# Patient Record
Sex: FEMALE | Race: WHITE | NOT HISPANIC OR LATINO
[De-identification: names, ages, dates, MRNs, and addresses within clinical notes are randomized per-mention and may not be internally consistent; named-entity substitution may affect disease eponyms.]

---

## 2018-06-16 ENCOUNTER — TRANSCRIPTION ENCOUNTER (OUTPATIENT)
Age: 33
End: 2018-06-16

## 2018-06-17 ENCOUNTER — INPATIENT (INPATIENT)
Facility: HOSPITAL | Age: 33
LOS: 2 days | Discharge: HOME | End: 2018-06-20
Attending: OBSTETRICS & GYNECOLOGY | Admitting: OBSTETRICS & GYNECOLOGY
Payer: COMMERCIAL

## 2018-06-17 ENCOUNTER — RESULT REVIEW (OUTPATIENT)
Age: 33
End: 2018-06-17

## 2018-06-17 VITALS — HEIGHT: 67 IN | WEIGHT: 202.83 LBS

## 2018-06-17 DIAGNOSIS — O26.899 OTHER SPECIFIED PREGNANCY RELATED CONDITIONS, UNSPECIFIED TRIMESTER: ICD-10-CM

## 2018-06-17 DIAGNOSIS — Z34.80 ENCOUNTER FOR SUPERVISION OF OTHER NORMAL PREGNANCY, UNSPECIFIED TRIMESTER: ICD-10-CM

## 2018-06-17 DIAGNOSIS — Z3A.00 WEEKS OF GESTATION OF PREGNANCY NOT SPECIFIED: ICD-10-CM

## 2018-06-17 LAB
BASOPHILS # BLD AUTO: 0 K/UL — SIGNIFICANT CHANGE UP (ref 0–0.2)
BASOPHILS NFR BLD AUTO: 0.2 % — SIGNIFICANT CHANGE UP (ref 0–2)
BLD GP AB SCN SERPL QL: NEGATIVE — SIGNIFICANT CHANGE UP
EOSINOPHIL # BLD AUTO: 0 K/UL — SIGNIFICANT CHANGE UP (ref 0–0.5)
EOSINOPHIL NFR BLD AUTO: 0.3 % — SIGNIFICANT CHANGE UP (ref 0–6)
HCT VFR BLD CALC: 39.4 % — SIGNIFICANT CHANGE UP (ref 34.5–45)
HGB BLD-MCNC: 13.8 G/DL — SIGNIFICANT CHANGE UP (ref 11.5–15.5)
LYMPHOCYTES # BLD AUTO: 1.8 K/UL — SIGNIFICANT CHANGE UP (ref 1–3.3)
LYMPHOCYTES # BLD AUTO: 14.6 % — SIGNIFICANT CHANGE UP (ref 13–44)
MCHC RBC-ENTMCNC: 31.3 PG — SIGNIFICANT CHANGE UP (ref 27–34)
MCHC RBC-ENTMCNC: 35 GM/DL — SIGNIFICANT CHANGE UP (ref 32–36)
MCV RBC AUTO: 89.6 FL — SIGNIFICANT CHANGE UP (ref 80–100)
MONOCYTES # BLD AUTO: 0.6 K/UL — SIGNIFICANT CHANGE UP (ref 0–0.9)
MONOCYTES NFR BLD AUTO: 5.3 % — SIGNIFICANT CHANGE UP (ref 2–14)
NEUTROPHILS # BLD AUTO: 9.5 K/UL — HIGH (ref 1.8–7.4)
NEUTROPHILS NFR BLD AUTO: 79.6 % — HIGH (ref 43–77)
PLATELET # BLD AUTO: 252 K/UL — SIGNIFICANT CHANGE UP (ref 150–400)
RBC # BLD: 4.4 M/UL — SIGNIFICANT CHANGE UP (ref 3.8–5.2)
RBC # FLD: 12.7 % — SIGNIFICANT CHANGE UP (ref 10.3–14.5)
RH IG SCN BLD-IMP: POSITIVE — SIGNIFICANT CHANGE UP
RH IG SCN BLD-IMP: POSITIVE — SIGNIFICANT CHANGE UP
WBC # BLD: 12 K/UL — HIGH (ref 3.8–10.5)
WBC # FLD AUTO: 12 K/UL — HIGH (ref 3.8–10.5)

## 2018-06-17 PROCEDURE — 88307 TISSUE EXAM BY PATHOLOGIST: CPT | Mod: 26

## 2018-06-17 RX ORDER — KETOROLAC TROMETHAMINE 30 MG/ML
30 SYRINGE (ML) INJECTION EVERY 6 HOURS
Qty: 0 | Refills: 0 | Status: DISCONTINUED | OUTPATIENT
Start: 2018-06-17 | End: 2018-06-19

## 2018-06-17 RX ORDER — OXYTOCIN 10 UNIT/ML
2 VIAL (ML) INJECTION
Qty: 30 | Refills: 0 | Status: DISCONTINUED | OUTPATIENT
Start: 2018-06-17 | End: 2018-06-20

## 2018-06-17 RX ORDER — TETANUS TOXOID, REDUCED DIPHTHERIA TOXOID AND ACELLULAR PERTUSSIS VACCINE, ADSORBED 5; 2.5; 8; 8; 2.5 [IU]/.5ML; [IU]/.5ML; UG/.5ML; UG/.5ML; UG/.5ML
0.5 SUSPENSION INTRAMUSCULAR ONCE
Qty: 0 | Refills: 0 | Status: DISCONTINUED | OUTPATIENT
Start: 2018-06-18 | End: 2018-06-20

## 2018-06-17 RX ORDER — HEPARIN SODIUM 5000 [USP'U]/ML
5000 INJECTION INTRAVENOUS; SUBCUTANEOUS EVERY 12 HOURS
Qty: 0 | Refills: 0 | Status: DISCONTINUED | OUTPATIENT
Start: 2018-06-17 | End: 2018-06-20

## 2018-06-17 RX ORDER — FENTANYL CITRATE 50 UG/ML
250 INJECTION INTRAVENOUS
Qty: 0 | Refills: 0 | Status: DISCONTINUED | OUTPATIENT
Start: 2018-06-17 | End: 2018-06-19

## 2018-06-17 RX ORDER — OXYTOCIN 10 UNIT/ML
333.33 VIAL (ML) INJECTION
Qty: 20 | Refills: 0 | Status: DISCONTINUED | OUTPATIENT
Start: 2018-06-17 | End: 2018-06-17

## 2018-06-17 RX ORDER — OXYTOCIN 10 UNIT/ML
333.33 VIAL (ML) INJECTION
Qty: 20 | Refills: 0 | Status: DISCONTINUED | OUTPATIENT
Start: 2018-06-17 | End: 2018-06-20

## 2018-06-17 RX ORDER — SODIUM CHLORIDE 9 MG/ML
500 INJECTION, SOLUTION INTRAVENOUS ONCE
Qty: 0 | Refills: 0 | Status: DISCONTINUED | OUTPATIENT
Start: 2018-06-17 | End: 2018-06-17

## 2018-06-17 RX ORDER — LANOLIN
1 OINTMENT (GRAM) TOPICAL
Qty: 0 | Refills: 0 | Status: DISCONTINUED | OUTPATIENT
Start: 2018-06-18 | End: 2018-06-20

## 2018-06-17 RX ORDER — GLYCERIN ADULT
1 SUPPOSITORY, RECTAL RECTAL AT BEDTIME
Qty: 0 | Refills: 0 | Status: DISCONTINUED | OUTPATIENT
Start: 2018-06-18 | End: 2018-06-20

## 2018-06-17 RX ORDER — OXYCODONE HYDROCHLORIDE 5 MG/1
5 TABLET ORAL EVERY 4 HOURS
Qty: 0 | Refills: 0 | Status: DISCONTINUED | OUTPATIENT
Start: 2018-06-17 | End: 2018-06-20

## 2018-06-17 RX ORDER — SODIUM CHLORIDE 9 MG/ML
1000 INJECTION, SOLUTION INTRAVENOUS
Qty: 0 | Refills: 0 | Status: DISCONTINUED | OUTPATIENT
Start: 2018-06-18 | End: 2018-06-20

## 2018-06-17 RX ORDER — DIPHENHYDRAMINE HCL 50 MG
25 CAPSULE ORAL EVERY 6 HOURS
Qty: 0 | Refills: 0 | Status: DISCONTINUED | OUTPATIENT
Start: 2018-06-17 | End: 2018-06-20

## 2018-06-17 RX ORDER — SODIUM CHLORIDE 9 MG/ML
1000 INJECTION, SOLUTION INTRAVENOUS
Qty: 0 | Refills: 0 | Status: DISCONTINUED | OUTPATIENT
Start: 2018-06-17 | End: 2018-06-17

## 2018-06-17 RX ORDER — OXYTOCIN 10 UNIT/ML
41.67 VIAL (ML) INJECTION
Qty: 20 | Refills: 0 | Status: DISCONTINUED | OUTPATIENT
Start: 2018-06-18 | End: 2018-06-20

## 2018-06-17 RX ORDER — NALOXONE HYDROCHLORIDE 4 MG/.1ML
0.1 SPRAY NASAL
Qty: 0 | Refills: 0 | Status: DISCONTINUED | OUTPATIENT
Start: 2018-06-17 | End: 2018-06-19

## 2018-06-17 RX ORDER — IBUPROFEN 200 MG
600 TABLET ORAL EVERY 6 HOURS
Qty: 0 | Refills: 0 | Status: COMPLETED | OUTPATIENT
Start: 2018-06-17 | End: 2019-05-16

## 2018-06-17 RX ORDER — OXYCODONE HYDROCHLORIDE 5 MG/1
5 TABLET ORAL
Qty: 0 | Refills: 0 | Status: COMPLETED | OUTPATIENT
Start: 2018-06-17 | End: 2018-06-24

## 2018-06-17 RX ORDER — CITRIC ACID/SODIUM CITRATE 300-500 MG
15 SOLUTION, ORAL ORAL EVERY 4 HOURS
Qty: 0 | Refills: 0 | Status: DISCONTINUED | OUTPATIENT
Start: 2018-06-17 | End: 2018-06-17

## 2018-06-17 RX ORDER — SIMETHICONE 80 MG/1
80 TABLET, CHEWABLE ORAL EVERY 4 HOURS
Qty: 0 | Refills: 0 | Status: DISCONTINUED | OUTPATIENT
Start: 2018-06-17 | End: 2018-06-20

## 2018-06-17 RX ORDER — FERROUS SULFATE 325(65) MG
325 TABLET ORAL DAILY
Qty: 0 | Refills: 0 | Status: DISCONTINUED | OUTPATIENT
Start: 2018-06-17 | End: 2018-06-20

## 2018-06-17 RX ORDER — OXYTOCIN 10 UNIT/ML
41.67 VIAL (ML) INJECTION
Qty: 20 | Refills: 0 | Status: DISCONTINUED | OUTPATIENT
Start: 2018-06-17 | End: 2018-06-17

## 2018-06-17 RX ORDER — ONDANSETRON 8 MG/1
4 TABLET, FILM COATED ORAL EVERY 6 HOURS
Qty: 0 | Refills: 0 | Status: DISCONTINUED | OUTPATIENT
Start: 2018-06-17 | End: 2018-06-19

## 2018-06-17 RX ORDER — DEXAMETHASONE 0.5 MG/5ML
4 ELIXIR ORAL EVERY 6 HOURS
Qty: 0 | Refills: 0 | Status: DISCONTINUED | OUTPATIENT
Start: 2018-06-17 | End: 2018-06-19

## 2018-06-17 RX ORDER — ACETAMINOPHEN 500 MG
975 TABLET ORAL EVERY 6 HOURS
Qty: 0 | Refills: 0 | Status: DISCONTINUED | OUTPATIENT
Start: 2018-06-17 | End: 2018-06-20

## 2018-06-17 RX ORDER — SODIUM CHLORIDE 9 MG/ML
1000 INJECTION, SOLUTION INTRAVENOUS
Qty: 0 | Refills: 0 | Status: DISCONTINUED | OUTPATIENT
Start: 2018-06-17 | End: 2018-06-20

## 2018-06-17 RX ORDER — DOCUSATE SODIUM 100 MG
100 CAPSULE ORAL
Qty: 0 | Refills: 0 | Status: DISCONTINUED | OUTPATIENT
Start: 2018-06-17 | End: 2018-06-20

## 2018-06-17 RX ADMIN — Medication 2 MILLIUNIT(S)/MIN: at 14:35

## 2018-06-17 RX ADMIN — FENTANYL CITRATE 250 MILLILITER(S): 50 INJECTION INTRAVENOUS at 19:21

## 2018-06-17 RX ADMIN — FENTANYL CITRATE 250 MILLILITER(S): 50 INJECTION INTRAVENOUS at 21:08

## 2018-06-18 LAB
HCT VFR BLD CALC: 35.2 % — SIGNIFICANT CHANGE UP (ref 34.5–45)
HGB BLD-MCNC: 11.7 G/DL — SIGNIFICANT CHANGE UP (ref 11.5–15.5)
MCHC RBC-ENTMCNC: 30.2 PG — SIGNIFICANT CHANGE UP (ref 27–34)
MCHC RBC-ENTMCNC: 33.2 GM/DL — SIGNIFICANT CHANGE UP (ref 32–36)
MCV RBC AUTO: 90.8 FL — SIGNIFICANT CHANGE UP (ref 80–100)
PLATELET # BLD AUTO: 227 K/UL — SIGNIFICANT CHANGE UP (ref 150–400)
RBC # BLD: 3.87 M/UL — SIGNIFICANT CHANGE UP (ref 3.8–5.2)
RBC # FLD: 12.9 % — SIGNIFICANT CHANGE UP (ref 10.3–14.5)
T PALLIDUM AB TITR SER: NEGATIVE — SIGNIFICANT CHANGE UP
WBC # BLD: 14.1 K/UL — HIGH (ref 3.8–10.5)
WBC # FLD AUTO: 14.1 K/UL — HIGH (ref 3.8–10.5)

## 2018-06-18 RX ADMIN — HEPARIN SODIUM 5000 UNIT(S): 5000 INJECTION INTRAVENOUS; SUBCUTANEOUS at 06:33

## 2018-06-18 RX ADMIN — Medication 975 MILLIGRAM(S): at 12:13

## 2018-06-18 RX ADMIN — Medication 975 MILLIGRAM(S): at 12:45

## 2018-06-18 RX ADMIN — FENTANYL CITRATE 250 MILLILITER(S): 50 INJECTION INTRAVENOUS at 15:27

## 2018-06-18 RX ADMIN — Medication 975 MILLIGRAM(S): at 00:37

## 2018-06-18 RX ADMIN — Medication 975 MILLIGRAM(S): at 06:35

## 2018-06-18 RX ADMIN — Medication 30 MILLIGRAM(S): at 06:34

## 2018-06-18 RX ADMIN — Medication 30 MILLIGRAM(S): at 17:06

## 2018-06-18 RX ADMIN — SIMETHICONE 80 MILLIGRAM(S): 80 TABLET, CHEWABLE ORAL at 23:23

## 2018-06-18 RX ADMIN — Medication 975 MILLIGRAM(S): at 17:35

## 2018-06-18 RX ADMIN — Medication 30 MILLIGRAM(S): at 23:23

## 2018-06-18 RX ADMIN — Medication 975 MILLIGRAM(S): at 01:30

## 2018-06-18 RX ADMIN — Medication 325 MILLIGRAM(S): at 12:14

## 2018-06-18 RX ADMIN — SIMETHICONE 80 MILLIGRAM(S): 80 TABLET, CHEWABLE ORAL at 17:05

## 2018-06-18 RX ADMIN — Medication 30 MILLIGRAM(S): at 12:14

## 2018-06-18 RX ADMIN — Medication 30 MILLIGRAM(S): at 12:45

## 2018-06-18 RX ADMIN — Medication 975 MILLIGRAM(S): at 23:23

## 2018-06-18 RX ADMIN — Medication 30 MILLIGRAM(S): at 00:36

## 2018-06-18 RX ADMIN — Medication 30 MILLIGRAM(S): at 17:35

## 2018-06-18 RX ADMIN — Medication 30 MILLIGRAM(S): at 01:30

## 2018-06-18 RX ADMIN — HEPARIN SODIUM 5000 UNIT(S): 5000 INJECTION INTRAVENOUS; SUBCUTANEOUS at 17:05

## 2018-06-18 RX ADMIN — Medication 975 MILLIGRAM(S): at 17:05

## 2018-06-18 NOTE — PROGRESS NOTE ADULT - SUBJECTIVE AND OBJECTIVE BOX
Day 1 of Anesthesia Pain Management Service    SUBJECTIVE: I'm okay  Pain Scale Score:    [X] Refer to charted pain scores    THERAPY: Epidural Bupivacaine 0.01 % and Fentanyl 3 micrograms/mL     Demand Dose: 3 mL  Lockout: 15 minutes   Continuous Rate:  10 mL    MEDICATIONS  (STANDING):  acetaminophen   Tablet. 975 milliGRAM(s) Oral every 6 hours  dextrose 5% + lactated ringers. 1000 milliLiter(s) (250 mL/Hr) IV Continuous <Continuous>  fentaNYL (3 MICROgram(s)/mL) + BUpivacaine 0.01% in 0.9% Sodium Chloride PCEA 250 milliLiter(s) Epidural PCA Continuous  ferrous    sulfate 325 milliGRAM(s) Oral daily  heparin  Injectable 5000 Unit(s) SubCutaneous every 12 hours  ibuprofen  Tablet 600 milliGRAM(s) Oral every 6 hours  ketorolac   Injectable 30 milliGRAM(s) IV Push every 6 hours  oxyCODONE    IR 5 milliGRAM(s) Oral every 3 hours  oxytocin Infusion 333.333 milliUNIT(s)/Min (1000 mL/Hr) IV Continuous <Continuous>  oxytocin Infusion 2 milliUNIT(s)/Min (2 mL/Hr) IV Continuous <Continuous>    MEDICATIONS  (PRN):  dexamethasone  Injectable 4 milliGRAM(s) IV Push every 6 hours PRN Nausea, IF ondansetron is ineffective after 30 - 60 minutes  diphenhydrAMINE   Capsule 25 milliGRAM(s) Oral every 6 hours PRN Itching  docusate sodium 100 milliGRAM(s) Oral two times a day PRN Stool Softening  fentaNYL (3 MICROgram(s)/mL) + BUpivacaine 0.01% in 0.9% Sodium Chloride PCEA Rescue Clinician Bolus 5 milliLiter(s) Epidural every 15 minutes PRN Moderate Pain (4 - 6)  naloxone Injectable 0.1 milliGRAM(s) IV Push every 3 minutes PRN For ANY of the following changes in patient status:  A. RR LESS THAN 10 breaths per minute, B. Oxygen saturation LESS THAN 90%, C. Sedation score of 6  ondansetron Injectable 4 milliGRAM(s) IV Push every 6 hours PRN Nausea  oxyCODONE    IR 5 milliGRAM(s) Oral every 4 hours PRN Severe Pain (7 - 10)  simethicone 80 milliGRAM(s) Chew every 4 hours PRN Gas      OBJECTIVE:    Assessment of Epidural Catheter Site: 	    [X] Dressing intact	[X] Site non-tender	[X] Site without erythema, discharge, edema  [X] Epidural tubing and connection checked	[X] Gross neurological exam within normal limits  [ ] Catheter removed – tip intact		                          11.7   14.1  )-----------( 227      ( 18 Jun 2018 06:44 )             35.2     Vital Signs Last 24 Hrs  T(C): 36.7 (06-18-18 @ 05:46), Max: 37.2 (06-17-18 @ 21:50)  T(F): 98.1 (06-18-18 @ 05:46), Max: 99 (06-17-18 @ 21:50)  HR: 67 (06-18-18 @ 05:46) (67 - 86)  BP: 103/67 (06-18-18 @ 05:46) (103/67 - 126/71)  BP(mean): 86 (06-17-18 @ 21:05) (79 - 86)  RR: 18 (06-18-18 @ 05:46) (16 - 27)  SpO2: 98% (06-18-18 @ 05:46) (96% - 100%)      Sedation Score:	[X] Alert	[ ] Drowsy	[ ] Arousable  [ ] Asleep  [ ] Unresponsive    Side Effects:	[X] None	[ ] Nausea	[ ] Vomiting  [ ] Pruritus  		[ ] Weakness  [ ] Numbness  [ ] Other:    ASSESSMENT/ PLAN:    Therapy:                         [X] Continue   [ ] Discontinue   [ ] Change to PRN Analgesics    Documentation and Verification of current medications:  [X] Done	[ ] Not done, not eligible, reason:    Comments:

## 2018-06-18 NOTE — PROGRESS NOTE ADULT - SUBJECTIVE AND OBJECTIVE BOX
OB Attending Note      S: Pt feeling well, +F, pain controlled    Physical exam:    Vital Signs Last 24 Hrs  T(C): 36.6 (2018 09:04), Max: 37.2 (2018 21:50)  T(F): 97.9 (2018 09:04), Max: 99 (2018 21:50)  HR: 78 (2018 09:04) (67 - 86)  BP: 121/71 (2018 09:04) (103/67 - 126/71)  BP(mean): 86 (2018 21:05) (79 - 86)  RR: 18 (2018 09:04) (16 - 27)  SpO2: 98% (2018 09:04) (96% - 100%)          Gen: NAD  Breast: Soft, nontender, not engorged.  Abdomen: Soft, nontender, no distension , firm uterine fundus at umbilicus.  Incision: Clean, dry, and intact with steri strips  Scant Lochia  Ext: No calf tenderness    LABS:                        11.7   14.1  )-----------( 227      ( 2018 06:44 )             35.2                         13.8   12.0  )-----------( 252      ( 2018 12:05 )             39.4     ABO Interpretation: O ( @ 12:56)  Rh Interpretation: Positive ( @ 12:56)  Antibody Screen: Negative ( @ 12:12)  ABO Interpretation: O ( @ 12:12)  Rh Interpretation: Positive ( @ 12:12)                      Assessment and Plan  POD # 1 s/p  section  Doing well.  Continue Ambulation/OOB/Venodynes/heparin  Cont Pain medications  Regular diet

## 2018-06-18 NOTE — PROGRESS NOTE ADULT - PROBLEM SELECTOR PLAN 1
- continue with PCEA  - increase ambulation  - SCDs when in bed  - encourage incentive spirometry  - continue regular diet  - renew IV fluids  - check AM CBC  - d/c Lovett  - incision dressing removed    Nuvia Adan MD PGY1 - continue with PCEA  - increase ambulation  - SCDs when in bed  - encourage incentive spirometry  - continue regular diet  - renew IV fluids  - check AM CBC  - incision dressing removed    Nuvia Adan MD PGY2

## 2018-06-18 NOTE — PROGRESS NOTE ADULT - SUBJECTIVE AND OBJECTIVE BOX
Pain Management Attending Addendum    SUBJECTIVE:    Therapy:	  [ ] IV PCA	   [ ] Epidural           [ ] s/p Spinal Opoid              [x ] Postpartum infusion	  [ ] Patient controlled regional anesthesia (PCRA)    [ ] prn Analgesics    OBJECTIVE:   [x ] No new signs     [ ] Other:    Side Effects:  [x ] None			[ ] Other:    Assessment of Catheter Site:		[ x] Intact		[ ] Other:    ASSESSMENT/PLAN  [x ] Continue current therapy    [ ] Therapy changed to:    [ ] IV PCA       [ ] Epidural     [ ] prn Analgesics     [ ] post partum infusion    Comments:

## 2018-06-18 NOTE — PROGRESS NOTE ADULT - SUBJECTIVE AND OBJECTIVE BOX
Patient seen and examined at bedside, no acute overnight events. No acute complaints, pain well controlled. Patient is ambulating and tolerating regular diet. Has not yet passed flatus or had BM. Lovett is still in place.     Vital Signs Last 24 Hours  T(C): 36.7 (06-18-18 @ 05:46), Max: 37.2 (06-17-18 @ 21:50)  HR: 67 (06-18-18 @ 05:46) (67 - 86)  BP: 103/67 (06-18-18 @ 05:46) (103/67 - 126/71)  RR: 18 (06-18-18 @ 05:46) (16 - 27)  SpO2: 98% (06-18-18 @ 05:46) (96% - 100%)    I&O's Summary    17 Jun 2018 07:01  -  18 Jun 2018 06:48  --------------------------------------------------------  IN: 2500 mL / OUT: 3930 mL / NET: -1430 mL        Physical Exam:  General: NAD  Abdomen: soft, appropriately tender, non-distended, fundus firm  Incision: Pfannenstiel incision CDI, no drainage, no erythema, subcuticular suture closure, steristrips in place  Pelvic: lochia wnl    Labs:  Blood Type: O Positive  Antibody Screen: --  RPR: Negative               13.8   12.0  )-----------( 252      ( 06-17 @ 12:05 )             39.4         MEDICATIONS  (STANDING):  acetaminophen   Tablet. 975 milliGRAM(s) Oral every 6 hours  dextrose 5% + lactated ringers. 1000 milliLiter(s) (250 mL/Hr) IV Continuous <Continuous>  fentaNYL (3 MICROgram(s)/mL) + BUpivacaine 0.01% in 0.9% Sodium Chloride PCEA 250 milliLiter(s) Epidural PCA Continuous  ferrous    sulfate 325 milliGRAM(s) Oral daily  heparin  Injectable 5000 Unit(s) SubCutaneous every 12 hours  ibuprofen  Tablet 600 milliGRAM(s) Oral every 6 hours  ketorolac   Injectable 30 milliGRAM(s) IV Push every 6 hours  oxyCODONE    IR 5 milliGRAM(s) Oral every 3 hours  oxytocin Infusion 333.333 milliUNIT(s)/Min (1000 mL/Hr) IV Continuous <Continuous>  oxytocin Infusion 2 milliUNIT(s)/Min (2 mL/Hr) IV Continuous <Continuous>    MEDICATIONS  (PRN):  dexamethasone  Injectable 4 milliGRAM(s) IV Push every 6 hours PRN Nausea, IF ondansetron is ineffective after 30 - 60 minutes  diphenhydrAMINE   Capsule 25 milliGRAM(s) Oral every 6 hours PRN Itching  docusate sodium 100 milliGRAM(s) Oral two times a day PRN Stool Softening  fentaNYL (3 MICROgram(s)/mL) + BUpivacaine 0.01% in 0.9% Sodium Chloride PCEA Rescue Clinician Bolus 5 milliLiter(s) Epidural every 15 minutes PRN Moderate Pain (4 - 6)  naloxone Injectable 0.1 milliGRAM(s) IV Push every 3 minutes PRN For ANY of the following changes in patient status:  A. RR LESS THAN 10 breaths per minute, B. Oxygen saturation LESS THAN 90%, C. Sedation score of 6  ondansetron Injectable 4 milliGRAM(s) IV Push every 6 hours PRN Nausea  oxyCODONE    IR 5 milliGRAM(s) Oral every 4 hours PRN Severe Pain (7 - 10)  simethicone 80 milliGRAM(s) Chew every 4 hours PRN Gas Patient seen and examined at bedside, no acute overnight events. No acute complaints, pain well controlled. Patient is ambulating and tolerating regular diet. Has not yet passed flatus or had BM.     Vital Signs Last 24 Hours  T(C): 36.7 (06-18-18 @ 05:46), Max: 37.2 (06-17-18 @ 21:50)  HR: 67 (06-18-18 @ 05:46) (67 - 86)  BP: 103/67 (06-18-18 @ 05:46) (103/67 - 126/71)  RR: 18 (06-18-18 @ 05:46) (16 - 27)  SpO2: 98% (06-18-18 @ 05:46) (96% - 100%)    I&O's Summary    17 Jun 2018 07:01  -  18 Jun 2018 06:48  --------------------------------------------------------  IN: 2500 mL / OUT: 3930 mL / NET: -1430 mL        Physical Exam:  General: NAD  Abdomen: soft, appropriately tender, non-distended, fundus firm  Incision: Pfannenstiel incision CDI, no drainage, no erythema, subcuticular suture closure, steristrips in place  Pelvic: lochia wnl    Labs:  Blood Type: O Positive  Antibody Screen: --  RPR: Negative               13.8   12.0  )-----------( 252      ( 06-17 @ 12:05 )             39.4         MEDICATIONS  (STANDING):  acetaminophen   Tablet. 975 milliGRAM(s) Oral every 6 hours  dextrose 5% + lactated ringers. 1000 milliLiter(s) (250 mL/Hr) IV Continuous <Continuous>  fentaNYL (3 MICROgram(s)/mL) + BUpivacaine 0.01% in 0.9% Sodium Chloride PCEA 250 milliLiter(s) Epidural PCA Continuous  ferrous    sulfate 325 milliGRAM(s) Oral daily  heparin  Injectable 5000 Unit(s) SubCutaneous every 12 hours  ibuprofen  Tablet 600 milliGRAM(s) Oral every 6 hours  ketorolac   Injectable 30 milliGRAM(s) IV Push every 6 hours  oxyCODONE    IR 5 milliGRAM(s) Oral every 3 hours  oxytocin Infusion 333.333 milliUNIT(s)/Min (1000 mL/Hr) IV Continuous <Continuous>  oxytocin Infusion 2 milliUNIT(s)/Min (2 mL/Hr) IV Continuous <Continuous>    MEDICATIONS  (PRN):  dexamethasone  Injectable 4 milliGRAM(s) IV Push every 6 hours PRN Nausea, IF ondansetron is ineffective after 30 - 60 minutes  diphenhydrAMINE   Capsule 25 milliGRAM(s) Oral every 6 hours PRN Itching  docusate sodium 100 milliGRAM(s) Oral two times a day PRN Stool Softening  fentaNYL (3 MICROgram(s)/mL) + BUpivacaine 0.01% in 0.9% Sodium Chloride PCEA Rescue Clinician Bolus 5 milliLiter(s) Epidural every 15 minutes PRN Moderate Pain (4 - 6)  naloxone Injectable 0.1 milliGRAM(s) IV Push every 3 minutes PRN For ANY of the following changes in patient status:  A. RR LESS THAN 10 breaths per minute, B. Oxygen saturation LESS THAN 90%, C. Sedation score of 6  ondansetron Injectable 4 milliGRAM(s) IV Push every 6 hours PRN Nausea  oxyCODONE    IR 5 milliGRAM(s) Oral every 4 hours PRN Severe Pain (7 - 10)  simethicone 80 milliGRAM(s) Chew every 4 hours PRN Gas

## 2018-06-19 RX ORDER — OXYCODONE HYDROCHLORIDE 5 MG/1
5 TABLET ORAL
Qty: 0 | Refills: 0 | Status: DISCONTINUED | OUTPATIENT
Start: 2018-06-19 | End: 2018-06-20

## 2018-06-19 RX ORDER — IBUPROFEN 200 MG
600 TABLET ORAL EVERY 6 HOURS
Qty: 0 | Refills: 0 | Status: DISCONTINUED | OUTPATIENT
Start: 2018-06-19 | End: 2018-06-20

## 2018-06-19 RX ADMIN — Medication 600 MILLIGRAM(S): at 11:20

## 2018-06-19 RX ADMIN — Medication 1 TABLET(S): at 11:24

## 2018-06-19 RX ADMIN — OXYCODONE HYDROCHLORIDE 5 MILLIGRAM(S): 5 TABLET ORAL at 11:58

## 2018-06-19 RX ADMIN — Medication 975 MILLIGRAM(S): at 06:03

## 2018-06-19 RX ADMIN — Medication 30 MILLIGRAM(S): at 06:03

## 2018-06-19 RX ADMIN — Medication 975 MILLIGRAM(S): at 17:12

## 2018-06-19 RX ADMIN — HEPARIN SODIUM 5000 UNIT(S): 5000 INJECTION INTRAVENOUS; SUBCUTANEOUS at 06:03

## 2018-06-19 RX ADMIN — Medication 975 MILLIGRAM(S): at 11:20

## 2018-06-19 RX ADMIN — Medication 600 MILLIGRAM(S): at 11:50

## 2018-06-19 RX ADMIN — SIMETHICONE 80 MILLIGRAM(S): 80 TABLET, CHEWABLE ORAL at 17:13

## 2018-06-19 RX ADMIN — HEPARIN SODIUM 5000 UNIT(S): 5000 INJECTION INTRAVENOUS; SUBCUTANEOUS at 17:13

## 2018-06-19 RX ADMIN — Medication 100 MILLIGRAM(S): at 17:13

## 2018-06-19 RX ADMIN — Medication 100 MILLIGRAM(S): at 11:20

## 2018-06-19 RX ADMIN — SIMETHICONE 80 MILLIGRAM(S): 80 TABLET, CHEWABLE ORAL at 11:20

## 2018-06-19 RX ADMIN — Medication 975 MILLIGRAM(S): at 23:33

## 2018-06-19 RX ADMIN — Medication 30 MILLIGRAM(S): at 06:20

## 2018-06-19 RX ADMIN — Medication 600 MILLIGRAM(S): at 17:47

## 2018-06-19 RX ADMIN — Medication 975 MILLIGRAM(S): at 11:50

## 2018-06-19 RX ADMIN — OXYCODONE HYDROCHLORIDE 5 MILLIGRAM(S): 5 TABLET ORAL at 12:30

## 2018-06-19 RX ADMIN — Medication 600 MILLIGRAM(S): at 23:34

## 2018-06-19 RX ADMIN — Medication 975 MILLIGRAM(S): at 17:45

## 2018-06-19 RX ADMIN — Medication 600 MILLIGRAM(S): at 17:12

## 2018-06-19 RX ADMIN — Medication 975 MILLIGRAM(S): at 06:56

## 2018-06-19 RX ADMIN — OXYCODONE HYDROCHLORIDE 5 MILLIGRAM(S): 5 TABLET ORAL at 23:35

## 2018-06-19 RX ADMIN — Medication 975 MILLIGRAM(S): at 00:20

## 2018-06-19 RX ADMIN — Medication 325 MILLIGRAM(S): at 11:20

## 2018-06-19 NOTE — PROGRESS NOTE ADULT - SUBJECTIVE AND OBJECTIVE BOX
Pain Management Attending Addendum    SUBJECTIVE:    Therapy:	  [ ] IV PCA	   [x ] Epidural           [ ] s/p Spinal Opoid              [ ] Postpartum infusion	  [ ] Patient controlled regional anesthesia (PCRA)    [ ] prn Analgesics    OBJECTIVE:   [ x] No new signs     [ ] Other:    Side Effects:  [x ] None			[ ] Other:    Assessment of Catheter Site:		[x ] Intact		[ ] Other:    ASSESSMENT/PLAN  [ ] Continue current therapy    [ ] Therapy changed to:    [ ] IV PCA       [ ] Epidural     [x ] prn Analgesics     [ ] post partum infusion    Comments:

## 2018-06-19 NOTE — PROGRESS NOTE ADULT - SUBJECTIVE AND OBJECTIVE BOX
POD#2  Patient seen and examined at bedside, no acute overnight events. No acute complaints, pain well controlled.  Patient is ambulating and tolerating regular diet. Passing flatus and voiding. Pt is breast feeding her baby.    Vital Signs Last 24 Hours  T(C): 37.1 (06-19-18 @ 05:00), Max: 37.1 (06-19-18 @ 01:00)  HR: 86 (06-19-18 @ 05:00) (68 - 86)  BP: 105/65 (06-19-18 @ 05:00) (102/64 - 137/71)  RR: 18 (06-19-18 @ 05:00) (17 - 18)  SpO2: 98% (06-19-18 @ 01:00) (98% - 99%)    I&O's Summary      Physical Exam:  General: NAD  Abdomen: Soft, non-tender, non-distended, fundus firm  Incision: Pfannenstiel incision CDI, subcuticular suture closure with steri  Pelvic: Lochia wnl  Ext: NTBL  Labs:  Blood type: O Positive  Rubella IgG: RPR: Negative                          11.7   14.1<H> >-----------< 227    ( 06-18 @ 06:44 )             35.2                        13.8   12.0<H> >-----------< 252    ( 06-17 @ 12:05 )             39.4                  MEDICATIONS  (STANDING):  acetaminophen   Tablet. 975 milliGRAM(s) Oral every 6 hours  dextrose 5% + lactated ringers. 1000 milliLiter(s) (250 mL/Hr) IV Continuous <Continuous>  diphtheria/tetanus/pertussis (acellular) Vaccine (ADAcel) 0.5 milliLiter(s) IntraMuscular once  fentaNYL (3 MICROgram(s)/mL) + BUpivacaine 0.01% in 0.9% Sodium Chloride PCEA 250 milliLiter(s) Epidural PCA Continuous  ferrous    sulfate 325 milliGRAM(s) Oral daily  heparin  Injectable 5000 Unit(s) SubCutaneous every 12 hours  ibuprofen  Tablet 600 milliGRAM(s) Oral every 6 hours  ketorolac   Injectable 30 milliGRAM(s) IV Push every 6 hours  lactated ringers. 1000 milliLiter(s) (125 mL/Hr) IV Continuous <Continuous>  oxyCODONE    IR 5 milliGRAM(s) Oral every 3 hours  oxytocin Infusion 41.667 milliUNIT(s)/Min (125 mL/Hr) IV Continuous <Continuous>  oxytocin Infusion 333.333 milliUNIT(s)/Min (1000 mL/Hr) IV Continuous <Continuous>  oxytocin Infusion 2 milliUNIT(s)/Min (2 mL/Hr) IV Continuous <Continuous>  prenatal multivitamin 1 Tablet(s) Oral daily    MEDICATIONS  (PRN):  dexamethasone  Injectable 4 milliGRAM(s) IV Push every 6 hours PRN Nausea, IF ondansetron is ineffective after 30 - 60 minutes  diphenhydrAMINE   Capsule 25 milliGRAM(s) Oral every 6 hours PRN Itching  docusate sodium 100 milliGRAM(s) Oral two times a day PRN Stool Softening  fentaNYL (3 MICROgram(s)/mL) + BUpivacaine 0.01% in 0.9% Sodium Chloride PCEA Rescue Clinician Bolus 5 milliLiter(s) Epidural every 15 minutes PRN Moderate Pain (4 - 6)  glycerin Suppository - Adult 1 Suppository(s) Rectal at bedtime PRN Constipation  lanolin Ointment 1 Application(s) Topical every 3 hours PRN Sore Nipples  naloxone Injectable 0.1 milliGRAM(s) IV Push every 3 minutes PRN For ANY of the following changes in patient status:  A. RR LESS THAN 10 breaths per minute, B. Oxygen saturation LESS THAN 90%, C. Sedation score of 6  ondansetron Injectable 4 milliGRAM(s) IV Push every 6 hours PRN Nausea  oxyCODONE    IR 5 milliGRAM(s) Oral every 4 hours PRN Severe Pain (7 - 10)  simethicone 80 milliGRAM(s) Chew every 4 hours PRN Gas

## 2018-06-19 NOTE — PROGRESS NOTE ADULT - PROBLEM SELECTOR PLAN 1
- Continue with po analgesia  - Increase ambulation  - Continue regular diet  - IV lock  - day 3 CBC  Eunice English PGY3

## 2018-06-19 NOTE — PROGRESS NOTE ADULT - SUBJECTIVE AND OBJECTIVE BOX
Day 2 of Anesthesia Pain Management Service    SUBJECTIVE: I'm okay  Pain Scale Score:    [X] Refer to charted pain scores    THERAPY: Epidural Bupivacaine 0.01 % and Fentanyl 3 micrograms/mL     Demand Dose: 3 mL  Lockout: 15 minutes   Continuous Rate:  10 mL    MEDICATIONS  (STANDING):  acetaminophen   Tablet. 975 milliGRAM(s) Oral every 6 hours  dextrose 5% + lactated ringers. 1000 milliLiter(s) (250 mL/Hr) IV Continuous <Continuous>  diphtheria/tetanus/pertussis (acellular) Vaccine (ADAcel) 0.5 milliLiter(s) IntraMuscular once  ferrous    sulfate 325 milliGRAM(s) Oral daily  heparin  Injectable 5000 Unit(s) SubCutaneous every 12 hours  ibuprofen  Tablet 600 milliGRAM(s) Oral every 6 hours  ketorolac   Injectable 30 milliGRAM(s) IV Push every 6 hours  lactated ringers. 1000 milliLiter(s) (125 mL/Hr) IV Continuous <Continuous>  oxyCODONE    IR 5 milliGRAM(s) Oral every 3 hours  oxytocin Infusion 41.667 milliUNIT(s)/Min (125 mL/Hr) IV Continuous <Continuous>  oxytocin Infusion 333.333 milliUNIT(s)/Min (1000 mL/Hr) IV Continuous <Continuous>  oxytocin Infusion 2 milliUNIT(s)/Min (2 mL/Hr) IV Continuous <Continuous>  prenatal multivitamin 1 Tablet(s) Oral daily    MEDICATIONS  (PRN):  diphenhydrAMINE   Capsule 25 milliGRAM(s) Oral every 6 hours PRN Itching  docusate sodium 100 milliGRAM(s) Oral two times a day PRN Stool Softening  glycerin Suppository - Adult 1 Suppository(s) Rectal at bedtime PRN Constipation  lanolin Ointment 1 Application(s) Topical every 3 hours PRN Sore Nipples  oxyCODONE    IR 5 milliGRAM(s) Oral every 4 hours PRN Severe Pain (7 - 10)  simethicone 80 milliGRAM(s) Chew every 4 hours PRN Gas      OBJECTIVE:    Assessment of Epidural Catheter Site: 	    [ ] Dressing intact	[X] Site non-tender	[X] Site without erythema, discharge, edema  [ ] Epidural tubing and connection checked	[X] Gross neurological exam within normal limits  [X] Catheter removed                          11.7   14.1  )-----------( 227      ( 18 Jun 2018 06:44 )             35.2     Vital Signs Last 24 Hrs  T(C): 36.8 (06-19-18 @ 09:15), Max: 37.1 (06-19-18 @ 01:00)  T(F): 98.2 (06-19-18 @ 09:15), Max: 98.8 (06-19-18 @ 01:00)  HR: 66 (06-19-18 @ 09:15) (66 - 86)  BP: 114/67 (06-19-18 @ 09:15) (102/64 - 137/71)  BP(mean): --  RR: 18 (06-19-18 @ 09:15) (17 - 18)  SpO2: 100% (06-19-18 @ 09:15) (98% - 100%)      Sedation Score:	[X] Alert	[ ] Drowsy	[ ] Arousable  [ ] Asleep  [ ] Unresponsive    Side Effects:	[X] None	[ ] Nausea	[ ] Vomiting  [ ] Pruritus  		[ ] Weakness  [ ] Numbness  [ ] Other:    ASSESSMENT/ PLAN:    Therapy:                         [ ] Continue   [X] Discontinue   [X] Change to PRN Analgesics    Documentation and Verification of current medications:  [X] Done	[ ] Not done, not eligible, reason:    Comments:

## 2018-06-20 ENCOUNTER — TRANSCRIPTION ENCOUNTER (OUTPATIENT)
Age: 33
End: 2018-06-20

## 2018-06-20 VITALS
HEART RATE: 67 BPM | TEMPERATURE: 98 F | RESPIRATION RATE: 18 BRPM | DIASTOLIC BLOOD PRESSURE: 71 MMHG | OXYGEN SATURATION: 100 % | SYSTOLIC BLOOD PRESSURE: 125 MMHG

## 2018-06-20 LAB
HCT VFR BLD CALC: 29.6 % — LOW (ref 34.5–45)
HGB BLD-MCNC: 10.4 G/DL — LOW (ref 11.5–15.5)
MCHC RBC-ENTMCNC: 32.5 PG — SIGNIFICANT CHANGE UP (ref 27–34)
MCHC RBC-ENTMCNC: 35.1 GM/DL — SIGNIFICANT CHANGE UP (ref 32–36)
MCV RBC AUTO: 92.5 FL — SIGNIFICANT CHANGE UP (ref 80–100)
PLATELET # BLD AUTO: 263 K/UL — SIGNIFICANT CHANGE UP (ref 150–400)
RBC # BLD: 3.2 M/UL — LOW (ref 3.8–5.2)
RBC # FLD: 12.8 % — SIGNIFICANT CHANGE UP (ref 10.3–14.5)
SURGICAL PATHOLOGY STUDY: SIGNIFICANT CHANGE UP
WBC # BLD: 11.8 K/UL — HIGH (ref 3.8–10.5)
WBC # FLD AUTO: 11.8 K/UL — HIGH (ref 3.8–10.5)

## 2018-06-20 PROCEDURE — 59025 FETAL NON-STRESS TEST: CPT

## 2018-06-20 PROCEDURE — 85027 COMPLETE CBC AUTOMATED: CPT

## 2018-06-20 PROCEDURE — 86900 BLOOD TYPING SEROLOGIC ABO: CPT

## 2018-06-20 PROCEDURE — 86901 BLOOD TYPING SEROLOGIC RH(D): CPT

## 2018-06-20 PROCEDURE — 88307 TISSUE EXAM BY PATHOLOGIST: CPT

## 2018-06-20 PROCEDURE — 86850 RBC ANTIBODY SCREEN: CPT

## 2018-06-20 PROCEDURE — 86780 TREPONEMA PALLIDUM: CPT

## 2018-06-20 PROCEDURE — 59050 FETAL MONITOR W/REPORT: CPT

## 2018-06-20 PROCEDURE — G0463: CPT

## 2018-06-20 PROCEDURE — C1765: CPT

## 2018-06-20 RX ORDER — IBUPROFEN 200 MG
1 TABLET ORAL
Qty: 0 | Refills: 0 | DISCHARGE
Start: 2018-06-20

## 2018-06-20 RX ORDER — ACETAMINOPHEN 500 MG
3 TABLET ORAL
Qty: 0 | Refills: 0 | DISCHARGE
Start: 2018-06-20

## 2018-06-20 RX ADMIN — Medication 975 MILLIGRAM(S): at 00:20

## 2018-06-20 RX ADMIN — Medication 600 MILLIGRAM(S): at 05:15

## 2018-06-20 RX ADMIN — SIMETHICONE 80 MILLIGRAM(S): 80 TABLET, CHEWABLE ORAL at 05:16

## 2018-06-20 RX ADMIN — Medication 600 MILLIGRAM(S): at 00:20

## 2018-06-20 RX ADMIN — Medication 975 MILLIGRAM(S): at 06:00

## 2018-06-20 RX ADMIN — HEPARIN SODIUM 5000 UNIT(S): 5000 INJECTION INTRAVENOUS; SUBCUTANEOUS at 05:15

## 2018-06-20 RX ADMIN — Medication 600 MILLIGRAM(S): at 06:00

## 2018-06-20 RX ADMIN — OXYCODONE HYDROCHLORIDE 5 MILLIGRAM(S): 5 TABLET ORAL at 00:20

## 2018-06-20 RX ADMIN — Medication 975 MILLIGRAM(S): at 05:17

## 2018-06-20 RX ADMIN — Medication 100 MILLIGRAM(S): at 05:16

## 2018-06-20 NOTE — DISCHARGE NOTE OB - CARE PLAN
Principal Discharge DX:	 delivery delivered  Goal:	return to normal activities over the next 5-6 weeks  Assessment and plan of treatment:	regular diet;  limited physical and sexual activities for 5-6 weeks;  to office in 2 weeks

## 2018-06-20 NOTE — PROGRESS NOTE ADULT - PROBLEM SELECTOR PLAN 1
- Continue with po analgesia  - Increase ambulation  - Continue regular diet  - IV lock  - AM JEREMIAH English PGY3

## 2018-06-20 NOTE — DISCHARGE NOTE OB - CARE PROVIDER_API CALL
Chema Rosa), Obstetrics and Gynecology  [address] [place] [address]  Phone: [phone]  Fax: [phone]

## 2018-06-20 NOTE — DISCHARGE NOTE OB - MATERIALS PROVIDED
Breastfeeding Mother’s Support Group Information/Guide to Postpartum Care/Vaccinations/[facility] Hearing Screen Program/Discharge Medication Information for Patients and Families Pocket Guide/[place]  Immunization Record/Breastfeeding Log/Breastfeeding Guide and Packet/Shaken Baby Prevention Handout/Birth Certificate Instructions/[facility]  Screening Program/Back To Sleep Handout

## 2018-06-20 NOTE — PROGRESS NOTE ADULT - ASSESSMENT
32  POD#3 from pLTCS doing well
32  POD#2 from pLTCS doing well
33y/o  POD#1 from pLTCS for NRFHT in stable condition.

## 2018-06-20 NOTE — PROGRESS NOTE ADULT - SUBJECTIVE AND OBJECTIVE BOX
POD#3  Patient seen and examined at bedside, no acute overnight events. No acute complaints, pain well controlled.  Patient is ambulating and tolerating regular diet. Passing flatus and voiding. Pt is breast feeding her baby.    Vital Signs Last 24 Hours  T(C): 36.5 (06-20-18 @ 05:00), Max: 36.8 (06-19-18 @ 09:15)  HR: 78 (06-20-18 @ 05:00) (66 - 82)  BP: 120/73 (06-20-18 @ 05:00) (106/69 - 120/73)  RR: 18 (06-20-18 @ 05:00) (17 - 18)  SpO2: 99% (06-19-18 @ 22:02) (97% - 100%)    I&O's Summary      Physical Exam:  General: NAD  Abdomen: Soft, non-tender, non-distended, fundus firm  Incision: Pfannenstiel incision CDI, subcuticular suture closure with steri  Pelvic: Lochia wnl  Ext: NTBL  Labs:  Blood type: O Positive  Rubella IgG: RPR: Negative                          11.7   14.1<H> >-----------< 227    ( 06-18 @ 06:44 )             35.2                        13.8   12.0<H> >-----------< 252    ( 06-17 @ 12:05 )             39.4                  MEDICATIONS  (STANDING):  acetaminophen   Tablet. 975 milliGRAM(s) Oral every 6 hours  dextrose 5% + lactated ringers. 1000 milliLiter(s) (250 mL/Hr) IV Continuous <Continuous>  diphtheria/tetanus/pertussis (acellular) Vaccine (ADAcel) 0.5 milliLiter(s) IntraMuscular once  ferrous    sulfate 325 milliGRAM(s) Oral daily  heparin  Injectable 5000 Unit(s) SubCutaneous every 12 hours  ibuprofen  Tablet 600 milliGRAM(s) Oral every 6 hours  lactated ringers. 1000 milliLiter(s) (125 mL/Hr) IV Continuous <Continuous>  oxyCODONE    IR 5 milliGRAM(s) Oral every 3 hours  oxytocin Infusion 41.667 milliUNIT(s)/Min (125 mL/Hr) IV Continuous <Continuous>  oxytocin Infusion 333.333 milliUNIT(s)/Min (1000 mL/Hr) IV Continuous <Continuous>  oxytocin Infusion 2 milliUNIT(s)/Min (2 mL/Hr) IV Continuous <Continuous>  prenatal multivitamin 1 Tablet(s) Oral daily    MEDICATIONS  (PRN):  diphenhydrAMINE   Capsule 25 milliGRAM(s) Oral every 6 hours PRN Itching  docusate sodium 100 milliGRAM(s) Oral two times a day PRN Stool Softening  glycerin Suppository - Adult 1 Suppository(s) Rectal at bedtime PRN Constipation  lanolin Ointment 1 Application(s) Topical every 3 hours PRN Sore Nipples  oxyCODONE    IR 5 milliGRAM(s) Oral every 4 hours PRN Severe Pain (7 - 10)  simethicone 80 milliGRAM(s) Chew every 4 hours PRN Gas

## 2018-06-20 NOTE — DISCHARGE NOTE OB - PATIENT PORTAL LINK FT
You can access the [facility] Patient Portal, offered by [facility] [place], by registering with the following website: [email or web address]

## 2018-06-20 NOTE — DISCHARGE NOTE OB - MEDICATION SUMMARY - MEDICATIONS TO TAKE
I will START or STAY ON the medications listed below when I get home from the hospital:    acetaminophen 325 mg oral tablet  -- 3 tab(s) by mouth every 6 hours  -- Indication: For mild to moderate pain    ibuprofen 600 mg oral tablet  -- 1 tab(s) by mouth every 6 hours  -- Indication: For mild to moderate pain    Prenatal Multivitamins with Folic Acid 1 mg oral tablet  -- 1 tab(s) by mouth once a day  -- Indication: For Supplement    Nick-Sequels  -- 1 tab(s) by mouth 2 times a day  -- Indication: For anemia

## 2019-01-30 ENCOUNTER — RESULT REVIEW (OUTPATIENT)
Age: 34
End: 2019-01-30

## 2020-01-23 PROBLEM — Z00.00 ENCOUNTER FOR PREVENTIVE HEALTH EXAMINATION: Status: ACTIVE | Noted: 2020-01-23

## 2020-01-24 ENCOUNTER — ASOB RESULT (OUTPATIENT)
Age: 35
End: 2020-01-24

## 2020-01-24 ENCOUNTER — APPOINTMENT (OUTPATIENT)
Dept: ANTEPARTUM | Facility: CLINIC | Age: 35
End: 2020-01-24
Payer: COMMERCIAL

## 2020-01-24 PROCEDURE — 76811 OB US DETAILED SNGL FETUS: CPT

## 2020-01-24 PROCEDURE — 76817 TRANSVAGINAL US OBSTETRIC: CPT

## 2020-05-08 ENCOUNTER — TRANSCRIPTION ENCOUNTER (OUTPATIENT)
Age: 35
End: 2020-05-08

## 2020-05-08 ENCOUNTER — APPOINTMENT (OUTPATIENT)
Dept: MATERNAL FETAL MEDICINE | Facility: CLINIC | Age: 35
End: 2020-05-08
Payer: COMMERCIAL

## 2020-05-08 DIAGNOSIS — O99.810 ABNORMAL GLUCOSE COMPLICATING PREGNANCY: ICD-10-CM

## 2020-05-08 PROCEDURE — G0109 DIAB MANAGE TRN IND/GROUP: CPT | Mod: 95

## 2020-05-08 RX ORDER — BLOOD-GLUCOSE METER
KIT MISCELLANEOUS
Qty: 2 | Refills: 2 | Status: ACTIVE | COMMUNITY
Start: 2020-05-08 | End: 1900-01-01

## 2020-05-08 RX ORDER — BLOOD-GLUCOSE METER
W/DEVICE KIT MISCELLANEOUS
Qty: 1 | Refills: 0 | Status: ACTIVE | COMMUNITY
Start: 2020-05-08 | End: 1900-01-01

## 2020-05-08 RX ORDER — LANCETS 33 GAUGE
EACH MISCELLANEOUS
Qty: 1 | Refills: 2 | Status: ACTIVE | COMMUNITY
Start: 2020-05-08 | End: 1900-01-01

## 2020-05-22 ENCOUNTER — APPOINTMENT (OUTPATIENT)
Dept: MATERNAL FETAL MEDICINE | Facility: CLINIC | Age: 35
End: 2020-05-22
Payer: COMMERCIAL

## 2020-05-22 ENCOUNTER — ASOB RESULT (OUTPATIENT)
Age: 35
End: 2020-05-22

## 2020-05-22 PROCEDURE — G0108 DIAB MANAGE TRN  PER INDIV: CPT | Mod: 95

## 2020-06-04 ENCOUNTER — APPOINTMENT (OUTPATIENT)
Dept: DISASTER EMERGENCY | Facility: CLINIC | Age: 35
End: 2020-06-04

## 2020-06-04 ENCOUNTER — TRANSCRIPTION ENCOUNTER (OUTPATIENT)
Age: 35
End: 2020-06-04

## 2020-06-04 DIAGNOSIS — Z01.818 ENCOUNTER FOR OTHER PREPROCEDURAL EXAMINATION: ICD-10-CM

## 2020-06-04 LAB — SARS-COV-2 N GENE NPH QL NAA+PROBE: NOT DETECTED

## 2020-06-05 ENCOUNTER — INPATIENT (INPATIENT)
Facility: HOSPITAL | Age: 35
LOS: 0 days | Discharge: HOME | End: 2020-06-06
Attending: OBSTETRICS & GYNECOLOGY | Admitting: OBSTETRICS & GYNECOLOGY
Payer: COMMERCIAL

## 2020-06-05 VITALS — WEIGHT: 187.39 LBS | HEIGHT: 67 IN

## 2020-06-05 LAB
BASOPHILS # BLD AUTO: 0.02 K/UL — SIGNIFICANT CHANGE UP (ref 0–0.2)
BASOPHILS NFR BLD AUTO: 0.2 % — SIGNIFICANT CHANGE UP (ref 0–2)
BLD GP AB SCN SERPL QL: NEGATIVE — SIGNIFICANT CHANGE UP
EOSINOPHIL # BLD AUTO: 0.08 K/UL — SIGNIFICANT CHANGE UP (ref 0–0.5)
EOSINOPHIL NFR BLD AUTO: 0.8 % — SIGNIFICANT CHANGE UP (ref 0–6)
GLUCOSE BLDC GLUCOMTR-MCNC: 80 MG/DL — SIGNIFICANT CHANGE UP (ref 70–99)
HCT VFR BLD CALC: 38.3 % — SIGNIFICANT CHANGE UP (ref 34.5–45)
HGB BLD-MCNC: 13.3 G/DL — SIGNIFICANT CHANGE UP (ref 11.5–15.5)
IMM GRANULOCYTES NFR BLD AUTO: 0.5 % — SIGNIFICANT CHANGE UP (ref 0–1.5)
LYMPHOCYTES # BLD AUTO: 2.26 K/UL — SIGNIFICANT CHANGE UP (ref 1–3.3)
LYMPHOCYTES # BLD AUTO: 24 % — SIGNIFICANT CHANGE UP (ref 13–44)
MCHC RBC-ENTMCNC: 32.1 PG — SIGNIFICANT CHANGE UP (ref 27–34)
MCHC RBC-ENTMCNC: 34.7 GM/DL — SIGNIFICANT CHANGE UP (ref 32–36)
MCV RBC AUTO: 92.5 FL — SIGNIFICANT CHANGE UP (ref 80–100)
MONOCYTES # BLD AUTO: 0.79 K/UL — SIGNIFICANT CHANGE UP (ref 0–0.9)
MONOCYTES NFR BLD AUTO: 8.4 % — SIGNIFICANT CHANGE UP (ref 2–14)
NEUTROPHILS # BLD AUTO: 6.23 K/UL — SIGNIFICANT CHANGE UP (ref 1.8–7.4)
NEUTROPHILS NFR BLD AUTO: 66.1 % — SIGNIFICANT CHANGE UP (ref 43–77)
NRBC # BLD: 0 /100 WBCS — SIGNIFICANT CHANGE UP (ref 0–0)
PLATELET # BLD AUTO: 197 K/UL — SIGNIFICANT CHANGE UP (ref 150–400)
RBC # BLD: 4.14 M/UL — SIGNIFICANT CHANGE UP (ref 3.8–5.2)
RBC # FLD: 13.3 % — SIGNIFICANT CHANGE UP (ref 10.3–14.5)
RH IG SCN BLD-IMP: POSITIVE — SIGNIFICANT CHANGE UP
WBC # BLD: 9.43 K/UL — SIGNIFICANT CHANGE UP (ref 3.8–10.5)
WBC # FLD AUTO: 9.43 K/UL — SIGNIFICANT CHANGE UP (ref 3.8–10.5)

## 2020-06-05 PROCEDURE — 88307 TISSUE EXAM BY PATHOLOGIST: CPT | Mod: 26

## 2020-06-05 RX ORDER — CITRIC ACID/SODIUM CITRATE 300-500 MG
30 SOLUTION, ORAL ORAL ONCE
Refills: 0 | Status: DISCONTINUED | OUTPATIENT
Start: 2020-06-05 | End: 2020-06-05

## 2020-06-05 RX ORDER — METOCLOPRAMIDE HCL 10 MG
10 TABLET ORAL ONCE
Refills: 0 | Status: COMPLETED | OUTPATIENT
Start: 2020-06-05 | End: 2020-06-05

## 2020-06-05 RX ORDER — SODIUM CHLORIDE 9 MG/ML
1000 INJECTION, SOLUTION INTRAVENOUS
Refills: 0 | Status: DISCONTINUED | OUTPATIENT
Start: 2020-06-05 | End: 2020-06-05

## 2020-06-05 RX ORDER — SODIUM CHLORIDE 9 MG/ML
1000 INJECTION, SOLUTION INTRAVENOUS
Refills: 0 | Status: DISCONTINUED | OUTPATIENT
Start: 2020-06-05 | End: 2020-06-06

## 2020-06-05 RX ORDER — NALOXONE HYDROCHLORIDE 4 MG/.1ML
0.1 SPRAY NASAL
Refills: 0 | Status: DISCONTINUED | OUTPATIENT
Start: 2020-06-05 | End: 2020-06-06

## 2020-06-05 RX ORDER — CITRIC ACID/SODIUM CITRATE 300-500 MG
30 SOLUTION, ORAL ORAL ONCE
Refills: 0 | Status: COMPLETED | OUTPATIENT
Start: 2020-06-05 | End: 2020-06-05

## 2020-06-05 RX ORDER — OXYTOCIN 10 UNIT/ML
333.33 VIAL (ML) INJECTION
Qty: 20 | Refills: 0 | Status: DISCONTINUED | OUTPATIENT
Start: 2020-06-05 | End: 2020-06-06

## 2020-06-05 RX ORDER — LANOLIN
1 OINTMENT (GRAM) TOPICAL EVERY 6 HOURS
Refills: 0 | Status: DISCONTINUED | OUTPATIENT
Start: 2020-06-05 | End: 2020-06-06

## 2020-06-05 RX ORDER — SODIUM CHLORIDE 9 MG/ML
1000 INJECTION, SOLUTION INTRAVENOUS ONCE
Refills: 0 | Status: DISCONTINUED | OUTPATIENT
Start: 2020-06-05 | End: 2020-06-05

## 2020-06-05 RX ORDER — DIPHENHYDRAMINE HCL 50 MG
25 CAPSULE ORAL EVERY 6 HOURS
Refills: 0 | Status: DISCONTINUED | OUTPATIENT
Start: 2020-06-05 | End: 2020-06-06

## 2020-06-05 RX ORDER — CEFAZOLIN SODIUM 1 G
2000 VIAL (EA) INJECTION ONCE
Refills: 0 | Status: COMPLETED | OUTPATIENT
Start: 2020-06-05 | End: 2020-06-05

## 2020-06-05 RX ORDER — TETANUS TOXOID, REDUCED DIPHTHERIA TOXOID AND ACELLULAR PERTUSSIS VACCINE, ADSORBED 5; 2.5; 8; 8; 2.5 [IU]/.5ML; [IU]/.5ML; UG/.5ML; UG/.5ML; UG/.5ML
0.5 SUSPENSION INTRAMUSCULAR ONCE
Refills: 0 | Status: DISCONTINUED | OUTPATIENT
Start: 2020-06-05 | End: 2020-06-06

## 2020-06-05 RX ORDER — OXYCODONE HYDROCHLORIDE 5 MG/1
5 TABLET ORAL ONCE
Refills: 0 | Status: DISCONTINUED | OUTPATIENT
Start: 2020-06-05 | End: 2020-06-06

## 2020-06-05 RX ORDER — LEVOTHYROXINE SODIUM 125 MCG
75 TABLET ORAL DAILY
Refills: 0 | Status: DISCONTINUED | OUTPATIENT
Start: 2020-06-05 | End: 2020-06-06

## 2020-06-05 RX ORDER — HEPARIN SODIUM 5000 [USP'U]/ML
5000 INJECTION INTRAVENOUS; SUBCUTANEOUS EVERY 12 HOURS
Refills: 0 | Status: DISCONTINUED | OUTPATIENT
Start: 2020-06-05 | End: 2020-06-06

## 2020-06-05 RX ORDER — OXYCODONE HYDROCHLORIDE 5 MG/1
5 TABLET ORAL
Refills: 0 | Status: DISCONTINUED | OUTPATIENT
Start: 2020-06-05 | End: 2020-06-06

## 2020-06-05 RX ORDER — DEXAMETHASONE 0.5 MG/5ML
4 ELIXIR ORAL EVERY 6 HOURS
Refills: 0 | Status: DISCONTINUED | OUTPATIENT
Start: 2020-06-05 | End: 2020-06-06

## 2020-06-05 RX ORDER — MAGNESIUM HYDROXIDE 400 MG/1
30 TABLET, CHEWABLE ORAL
Refills: 0 | Status: DISCONTINUED | OUTPATIENT
Start: 2020-06-05 | End: 2020-06-06

## 2020-06-05 RX ORDER — LEVOTHYROXINE SODIUM 125 MCG
75 TABLET ORAL DAILY
Refills: 0 | Status: DISCONTINUED | OUTPATIENT
Start: 2020-06-05 | End: 2020-06-05

## 2020-06-05 RX ORDER — METOCLOPRAMIDE HCL 10 MG
10 TABLET ORAL ONCE
Refills: 0 | Status: DISCONTINUED | OUTPATIENT
Start: 2020-06-05 | End: 2020-06-05

## 2020-06-05 RX ORDER — FAMOTIDINE 10 MG/ML
20 INJECTION INTRAVENOUS ONCE
Refills: 0 | Status: COMPLETED | OUTPATIENT
Start: 2020-06-05 | End: 2020-06-05

## 2020-06-05 RX ORDER — IBUPROFEN 200 MG
600 TABLET ORAL EVERY 6 HOURS
Refills: 0 | Status: COMPLETED | OUTPATIENT
Start: 2020-06-05 | End: 2021-05-04

## 2020-06-05 RX ORDER — SODIUM CHLORIDE 9 MG/ML
1000 INJECTION, SOLUTION INTRAVENOUS ONCE
Refills: 0 | Status: COMPLETED | OUTPATIENT
Start: 2020-06-05 | End: 2020-06-05

## 2020-06-05 RX ORDER — MORPHINE SULFATE 50 MG/1
0.1 CAPSULE, EXTENDED RELEASE ORAL ONCE
Refills: 0 | Status: DISCONTINUED | OUTPATIENT
Start: 2020-06-05 | End: 2020-06-06

## 2020-06-05 RX ORDER — NALBUPHINE HYDROCHLORIDE 10 MG/ML
2.5 INJECTION, SOLUTION INTRAMUSCULAR; INTRAVENOUS; SUBCUTANEOUS EVERY 6 HOURS
Refills: 0 | Status: DISCONTINUED | OUTPATIENT
Start: 2020-06-05 | End: 2020-06-06

## 2020-06-05 RX ORDER — KETOROLAC TROMETHAMINE 30 MG/ML
30 SYRINGE (ML) INJECTION EVERY 6 HOURS
Refills: 0 | Status: DISCONTINUED | OUTPATIENT
Start: 2020-06-05 | End: 2020-06-06

## 2020-06-05 RX ORDER — SIMETHICONE 80 MG/1
80 TABLET, CHEWABLE ORAL EVERY 4 HOURS
Refills: 0 | Status: DISCONTINUED | OUTPATIENT
Start: 2020-06-05 | End: 2020-06-06

## 2020-06-05 RX ORDER — ACETAMINOPHEN 500 MG
975 TABLET ORAL
Refills: 0 | Status: DISCONTINUED | OUTPATIENT
Start: 2020-06-05 | End: 2020-06-06

## 2020-06-05 RX ORDER — ONDANSETRON 8 MG/1
4 TABLET, FILM COATED ORAL EVERY 6 HOURS
Refills: 0 | Status: DISCONTINUED | OUTPATIENT
Start: 2020-06-05 | End: 2020-06-06

## 2020-06-05 RX ORDER — DIPHENHYDRAMINE HCL 50 MG
25 CAPSULE ORAL EVERY 4 HOURS
Refills: 0 | Status: DISCONTINUED | OUTPATIENT
Start: 2020-06-05 | End: 2020-06-06

## 2020-06-05 RX ORDER — FAMOTIDINE 10 MG/ML
20 INJECTION INTRAVENOUS ONCE
Refills: 0 | Status: DISCONTINUED | OUTPATIENT
Start: 2020-06-05 | End: 2020-06-05

## 2020-06-05 RX ADMIN — Medication 30 MILLIGRAM(S): at 09:00

## 2020-06-05 RX ADMIN — SIMETHICONE 80 MILLIGRAM(S): 80 TABLET, CHEWABLE ORAL at 23:33

## 2020-06-05 RX ADMIN — Medication 30 MILLIGRAM(S): at 15:02

## 2020-06-05 RX ADMIN — ONDANSETRON 4 MILLIGRAM(S): 8 TABLET, FILM COATED ORAL at 13:00

## 2020-06-05 RX ADMIN — HEPARIN SODIUM 5000 UNIT(S): 5000 INJECTION INTRAVENOUS; SUBCUTANEOUS at 18:06

## 2020-06-05 RX ADMIN — Medication 10 MILLIGRAM(S): at 08:00

## 2020-06-05 RX ADMIN — SODIUM CHLORIDE 2000 MILLILITER(S): 9 INJECTION, SOLUTION INTRAVENOUS at 06:00

## 2020-06-05 RX ADMIN — Medication 75 MICROGRAM(S): at 10:25

## 2020-06-05 RX ADMIN — Medication 1000 MILLIUNIT(S)/MIN: at 08:26

## 2020-06-05 RX ADMIN — Medication 1000 MILLIUNIT(S)/MIN: at 09:05

## 2020-06-05 RX ADMIN — Medication 30 MILLIGRAM(S): at 20:53

## 2020-06-05 RX ADMIN — Medication 975 MILLIGRAM(S): at 18:05

## 2020-06-05 RX ADMIN — ONDANSETRON 4 MILLIGRAM(S): 8 TABLET, FILM COATED ORAL at 08:00

## 2020-06-05 RX ADMIN — Medication 100 MILLIGRAM(S): at 07:55

## 2020-06-05 RX ADMIN — FAMOTIDINE 20 MILLIGRAM(S): 10 INJECTION INTRAVENOUS at 07:08

## 2020-06-05 RX ADMIN — Medication 4 MILLIGRAM(S): at 08:00

## 2020-06-05 RX ADMIN — SODIUM CHLORIDE 125 MILLILITER(S): 9 INJECTION, SOLUTION INTRAVENOUS at 07:27

## 2020-06-05 RX ADMIN — Medication 30 MILLILITER(S): at 07:08

## 2020-06-05 RX ADMIN — Medication 975 MILLIGRAM(S): at 23:33

## 2020-06-06 ENCOUNTER — TRANSCRIPTION ENCOUNTER (OUTPATIENT)
Age: 35
End: 2020-06-06

## 2020-06-06 VITALS
SYSTOLIC BLOOD PRESSURE: 113 MMHG | OXYGEN SATURATION: 99 % | HEART RATE: 72 BPM | RESPIRATION RATE: 18 BRPM | DIASTOLIC BLOOD PRESSURE: 74 MMHG | TEMPERATURE: 98 F

## 2020-06-06 LAB
BASOPHILS # BLD AUTO: 0.01 K/UL — SIGNIFICANT CHANGE UP (ref 0–0.2)
BASOPHILS NFR BLD AUTO: 0.1 % — SIGNIFICANT CHANGE UP (ref 0–2)
EOSINOPHIL # BLD AUTO: 0.06 K/UL — SIGNIFICANT CHANGE UP (ref 0–0.5)
EOSINOPHIL NFR BLD AUTO: 0.7 % — SIGNIFICANT CHANGE UP (ref 0–6)
HCT VFR BLD CALC: 33.1 % — LOW (ref 34.5–45)
HGB BLD-MCNC: 10.6 G/DL — LOW (ref 11.5–15.5)
IMM GRANULOCYTES NFR BLD AUTO: 0.4 % — SIGNIFICANT CHANGE UP (ref 0–1.5)
LYMPHOCYTES # BLD AUTO: 1.91 K/UL — SIGNIFICANT CHANGE UP (ref 1–3.3)
LYMPHOCYTES # BLD AUTO: 21.2 % — SIGNIFICANT CHANGE UP (ref 13–44)
MCHC RBC-ENTMCNC: 30.2 PG — SIGNIFICANT CHANGE UP (ref 27–34)
MCHC RBC-ENTMCNC: 32 GM/DL — SIGNIFICANT CHANGE UP (ref 32–36)
MCV RBC AUTO: 94.3 FL — SIGNIFICANT CHANGE UP (ref 80–100)
MONOCYTES # BLD AUTO: 0.66 K/UL — SIGNIFICANT CHANGE UP (ref 0–0.9)
MONOCYTES NFR BLD AUTO: 7.3 % — SIGNIFICANT CHANGE UP (ref 2–14)
NEUTROPHILS # BLD AUTO: 6.31 K/UL — SIGNIFICANT CHANGE UP (ref 1.8–7.4)
NEUTROPHILS NFR BLD AUTO: 70.3 % — SIGNIFICANT CHANGE UP (ref 43–77)
NRBC # BLD: 0 /100 WBCS — SIGNIFICANT CHANGE UP (ref 0–0)
PLATELET # BLD AUTO: 178 K/UL — SIGNIFICANT CHANGE UP (ref 150–400)
RBC # BLD: 3.51 M/UL — LOW (ref 3.8–5.2)
RBC # FLD: 13.2 % — SIGNIFICANT CHANGE UP (ref 10.3–14.5)
T PALLIDUM AB TITR SER: NEGATIVE — SIGNIFICANT CHANGE UP
WBC # BLD: 8.99 K/UL — SIGNIFICANT CHANGE UP (ref 3.8–10.5)
WBC # FLD AUTO: 8.99 K/UL — SIGNIFICANT CHANGE UP (ref 3.8–10.5)

## 2020-06-06 PROCEDURE — 59050 FETAL MONITOR W/REPORT: CPT

## 2020-06-06 PROCEDURE — 85027 COMPLETE CBC AUTOMATED: CPT

## 2020-06-06 PROCEDURE — 88307 TISSUE EXAM BY PATHOLOGIST: CPT

## 2020-06-06 PROCEDURE — 86901 BLOOD TYPING SEROLOGIC RH(D): CPT

## 2020-06-06 PROCEDURE — 86780 TREPONEMA PALLIDUM: CPT

## 2020-06-06 PROCEDURE — 86850 RBC ANTIBODY SCREEN: CPT

## 2020-06-06 PROCEDURE — 86900 BLOOD TYPING SEROLOGIC ABO: CPT

## 2020-06-06 PROCEDURE — C1889: CPT

## 2020-06-06 PROCEDURE — 82962 GLUCOSE BLOOD TEST: CPT

## 2020-06-06 PROCEDURE — 59025 FETAL NON-STRESS TEST: CPT

## 2020-06-06 RX ORDER — LEVOTHYROXINE SODIUM 125 MCG
1 TABLET ORAL
Qty: 0 | Refills: 0 | DISCHARGE

## 2020-06-06 RX ORDER — FERROUS FUMARATE 350(115)MG
1 TABLET ORAL
Qty: 0 | Refills: 0 | DISCHARGE

## 2020-06-06 RX ORDER — IBUPROFEN 200 MG
600 TABLET ORAL EVERY 6 HOURS
Refills: 0 | Status: DISCONTINUED | OUTPATIENT
Start: 2020-06-06 | End: 2020-06-06

## 2020-06-06 RX ORDER — OXYCODONE HYDROCHLORIDE 5 MG/1
1 TABLET ORAL
Qty: 0 | Refills: 0 | DISCHARGE
Start: 2020-06-06

## 2020-06-06 RX ADMIN — Medication 975 MILLIGRAM(S): at 11:43

## 2020-06-06 RX ADMIN — Medication 75 MICROGRAM(S): at 05:24

## 2020-06-06 RX ADMIN — Medication 600 MILLIGRAM(S): at 16:30

## 2020-06-06 RX ADMIN — SIMETHICONE 80 MILLIGRAM(S): 80 TABLET, CHEWABLE ORAL at 08:58

## 2020-06-06 RX ADMIN — HEPARIN SODIUM 5000 UNIT(S): 5000 INJECTION INTRAVENOUS; SUBCUTANEOUS at 05:24

## 2020-06-06 RX ADMIN — Medication 600 MILLIGRAM(S): at 08:57

## 2020-06-06 RX ADMIN — Medication 975 MILLIGRAM(S): at 05:24

## 2020-06-06 RX ADMIN — Medication 30 MILLIGRAM(S): at 02:20

## 2020-06-06 NOTE — PROGRESS NOTE ADULT - ASSESSMENT
A/P:  34y  POD # 1 S/P  repeat    section  Doing well    PMHx:PAST MEDICAL & SURGICAL HISTORY:    Current Issues:

## 2020-06-06 NOTE — DISCHARGE NOTE OB - MATERIALS PROVIDED
Immunization Record/Breastfeeding Log/Shaken Baby Prevention Handout/Breastfeeding Guide and Packet/Back To Sleep Handout/[facility] Hearing Screen Program/Breastfeeding Mother’s Support Group Information/Guide to Postpartum Care/Birth Certificate Instructions/[facility]  Screening Program

## 2020-06-06 NOTE — PROGRESS NOTE ADULT - PROBLEM SELECTOR PLAN 1
Increase OOB  PO Pain Protocol  DVT ppx  Dressing removed  Regular diet  AM CBC  Routine Postpartum/Post-op care

## 2020-06-06 NOTE — PROGRESS NOTE ADULT - ASSESSMENT
Assessment and Plan  POD #1 s/p repeat  section  Doing well.  Continue Ambulation/OOB/Venodynes/heparin  Cont Pain medications  Regular diet  Patient would like to go home tonight. Discussed would need to pass flatus prior to discharge. Discharge planning. Reviewed discharge instructions. FU in 2 weeks for incision check    Laurence Cruz DO

## 2020-06-06 NOTE — PROGRESS NOTE ADULT - SUBJECTIVE AND OBJECTIVE BOX
Day __1_ of Anesthesia Pain Management Service    SUBJECTIVE:  Pain Scale Score	At rest: ___ 	With Activity: ___ 	[x ] Refer to charted pain scores    THERAPY:    s/p ___100_____ mcg PF morphine on _6/5/20_____      MEDICATIONS  (STANDING):  acetaminophen   Tablet .. 975 milliGRAM(s) Oral <User Schedule>  diphtheria/tetanus/pertussis (acellular) Vaccine (ADAcel) 0.5 milliLiter(s) IntraMuscular once  heparin   Injectable 5000 Unit(s) SubCutaneous every 12 hours  ibuprofen  Tablet. 600 milliGRAM(s) Oral every 6 hours  lactated ringers. 1000 milliLiter(s) (125 mL/Hr) IV Continuous <Continuous>  levothyroxine 75 MICROGram(s) Oral daily  oxytocin Infusion 333.333 milliUNIT(s)/Min (1000 mL/Hr) IV Continuous <Continuous>  oxytocin Infusion 333.333 milliUNIT(s)/Min (1000 mL/Hr) IV Continuous <Continuous>  oxytocin Infusion 333.333 milliUNIT(s)/Min (1000 mL/Hr) IV Continuous <Continuous>    MEDICATIONS  (PRN):  diphenhydrAMINE 25 milliGRAM(s) Oral every 6 hours PRN Itching  lanolin Ointment 1 Application(s) Topical every 6 hours PRN Sore Nipples  magnesium hydroxide Suspension 30 milliLiter(s) Oral two times a day PRN Constipation  oxyCODONE    IR 5 milliGRAM(s) Oral every 3 hours PRN Moderate to Severe Pain (4-10)  oxyCODONE    IR 5 milliGRAM(s) Oral once PRN Moderate to Severe Pain (4-10)  simethicone 80 milliGRAM(s) Chew every 4 hours PRN Gas      OBJECTIVE:    Sedation Score:	[ x] Alert	[ ] Drowsy	[ ] Arousable	[ ] Asleep	[ ] Unresponsive    Side Effects:	[x ] None	[ ] Nausea	[ ] Vomiting	[ ] Pruritus  		  [ ] Weakness		[ ] Numbness	[ ] Other:    Vital Signs Last 24 Hrs  T(C): 36.6 (06 Jun 2020 08:35), Max: 36.8 (05 Jun 2020 17:10)  T(F): 97.8 (06 Jun 2020 08:35), Max: 98.2 (05 Jun 2020 17:10)  HR: 66 (06 Jun 2020 08:35) (60 - 77)  BP: 97/60 (06 Jun 2020 08:35) (92/55 - 108/67)  BP(mean): --  RR: 18 (06 Jun 2020 08:35) (17 - 18)  SpO2: 99% (06 Jun 2020 08:35) (97% - 100%)    ASSESSMENT/ PLAN  [x ] Patient transitioned to prn analgesics  [ x] Pain management per primary service, pain service to sign off   [ ]Documentation and Verification of current medications     Comments:

## 2020-06-06 NOTE — DISCHARGE NOTE OB - MEDICATION SUMMARY - MEDICATIONS TO TAKE
I will START or STAY ON the medications listed below when I get home from the hospital:    oxyCODONE 5 mg oral tablet  -- 1 tab(s) by mouth every 3 hours, As needed, Moderate to Severe Pain (4-10)  -- Indication: For pain moderate    Prenatal Multivitamins with Folic Acid 1 mg oral tablet  -- 1 tab(s) by mouth once a day  -- Indication: For breastfeeding

## 2020-06-06 NOTE — DISCHARGE NOTE OB - CARE PROVIDER_API CALL
Laurence Cruz (DO)  [facility] [address], [address]  [place] [id number]  Phone: [phone]  Fax: [phone]  Follow Up Time:

## 2020-06-06 NOTE — DISCHARGE NOTE OB - ADDITIONAL INSTRUCTIONS
Please call if you have fever above 100.4, heavy bleeding, signs of wound infection, pain uncontrolled with pain medicine

## 2020-06-06 NOTE — DISCHARGE NOTE OB - PATIENT PORTAL LINK FT
You can access the FollowMyHealth Patient Portal offered by [facility] by registering at the following website: [email or web address]. By joining [employer]’s FollowMyHealth portal, you will also be able to view your health information using other applications (apps) compatible with our system.

## 2020-06-06 NOTE — PROGRESS NOTE ADULT - SUBJECTIVE AND OBJECTIVE BOX
Postpartum Note-  Section POD#1    Prenatal Labs  Blood type: O Positive  Rubella IgG: Immune  RPR: Negative          S:Patient w/o complaints, pain is controlled.  Pt is OOB, tolerating PO, passing flatus. Voiding. Lochia WNL.     O:  Vital Signs Last 24 Hrs  T(C): 36.4 (2020 05:00), Max: 36.8 (2020 11:25)  T(F): 97.5 (2020 05:00), Max: 98.2 (2020 11:25)  HR: 77 (2020 05:00) (54 - 77)  BP: 100/64 (2020 05:00) (92/55 - 129/58)  BP(mean): 78 (2020 11:10) (70 - 83)  RR: 18 (2020 05:00) (17 - 26)  SpO2: 97% (2020 05:00) (97% - 100%)  I&O's Summary    2020 07:01  -  2020 07:00  --------------------------------------------------------  IN: 3267 mL / OUT: 3400 mL / NET: -133 mL        Gen: NAD  Abdomen: Soft, appropriate tenderness, non-distended, fundus firm.  Incision: Clean/dry/ intact. no erythema, no ecchymosis   Sub Q/Steris  Gerald WNL  Ext:Nontender    LABS:             10.6   8.99  )-----------( 178      (  @ 06:19 )             33.1                13.3   9.43  )-----------( 197      (  @ 06:43 )             38.3

## 2020-06-06 NOTE — DISCHARGE NOTE OB - CARE PLAN
Principal Discharge DX:	 delivery delivered  Goal:	pain control and ambulation  Assessment and plan of treatment:	pain control and ambulation

## 2020-06-12 LAB — SURGICAL PATHOLOGY STUDY: SIGNIFICANT CHANGE UP

## 2021-03-23 ENCOUNTER — RESULT REVIEW (OUTPATIENT)
Age: 36
End: 2021-03-23

## 2021-06-15 ENCOUNTER — APPOINTMENT (OUTPATIENT)
Dept: ANTEPARTUM | Facility: CLINIC | Age: 36
End: 2021-06-15
Payer: COMMERCIAL

## 2021-06-15 ENCOUNTER — ASOB RESULT (OUTPATIENT)
Age: 36
End: 2021-06-15

## 2021-06-15 ENCOUNTER — NON-APPOINTMENT (OUTPATIENT)
Age: 36
End: 2021-06-15

## 2021-06-15 PROCEDURE — 76811 OB US DETAILED SNGL FETUS: CPT

## 2021-08-16 ENCOUNTER — ASOB RESULT (OUTPATIENT)
Age: 36
End: 2021-08-16

## 2021-08-16 ENCOUNTER — APPOINTMENT (OUTPATIENT)
Dept: MATERNAL FETAL MEDICINE | Facility: CLINIC | Age: 36
End: 2021-08-16
Payer: COMMERCIAL

## 2021-08-16 PROCEDURE — G0108 DIAB MANAGE TRN  PER INDIV: CPT | Mod: 95

## 2021-08-17 RX ORDER — BLOOD SUGAR DIAGNOSTIC
STRIP MISCELLANEOUS 4 TIMES DAILY
Qty: 4 | Refills: 2 | Status: ACTIVE | COMMUNITY
Start: 2021-08-16 | End: 1900-01-01

## 2021-08-17 RX ORDER — LANCETS 33 GAUGE
EACH MISCELLANEOUS
Qty: 4 | Refills: 2 | Status: ACTIVE | COMMUNITY
Start: 2021-08-16 | End: 1900-01-01

## 2021-08-30 ENCOUNTER — APPOINTMENT (OUTPATIENT)
Dept: MATERNAL FETAL MEDICINE | Facility: CLINIC | Age: 36
End: 2021-08-30
Payer: COMMERCIAL

## 2021-08-30 ENCOUNTER — ASOB RESULT (OUTPATIENT)
Age: 36
End: 2021-08-30

## 2021-08-30 PROCEDURE — G0108 DIAB MANAGE TRN  PER INDIV: CPT | Mod: 95

## 2021-09-20 ENCOUNTER — APPOINTMENT (OUTPATIENT)
Dept: MATERNAL FETAL MEDICINE | Facility: CLINIC | Age: 36
End: 2021-09-20
Payer: COMMERCIAL

## 2021-09-20 ENCOUNTER — ASOB RESULT (OUTPATIENT)
Age: 36
End: 2021-09-20

## 2021-09-20 PROCEDURE — G0108 DIAB MANAGE TRN  PER INDIV: CPT | Mod: 95

## 2021-09-20 RX ORDER — URINE ACETONE TEST STRIPS
STRIP MISCELLANEOUS
Qty: 1 | Refills: 0 | Status: ACTIVE | COMMUNITY
Start: 2021-08-16 | End: 1900-01-01

## 2021-10-08 ENCOUNTER — APPOINTMENT (OUTPATIENT)
Dept: MATERNAL FETAL MEDICINE | Facility: CLINIC | Age: 36
End: 2021-10-08

## 2021-10-14 ENCOUNTER — OUTPATIENT (OUTPATIENT)
Dept: OUTPATIENT SERVICES | Facility: HOSPITAL | Age: 36
LOS: 1 days | End: 2021-10-14
Payer: COMMERCIAL

## 2021-10-14 VITALS
TEMPERATURE: 97 F | HEIGHT: 67 IN | RESPIRATION RATE: 16 BRPM | OXYGEN SATURATION: 98 % | SYSTOLIC BLOOD PRESSURE: 117 MMHG | DIASTOLIC BLOOD PRESSURE: 70 MMHG | WEIGHT: 190.92 LBS | HEART RATE: 74 BPM

## 2021-10-14 VITALS
DIASTOLIC BLOOD PRESSURE: 57 MMHG | SYSTOLIC BLOOD PRESSURE: 120 MMHG | RESPIRATION RATE: 16 BRPM | HEART RATE: 70 BPM | TEMPERATURE: 98 F

## 2021-10-14 VITALS — DIASTOLIC BLOOD PRESSURE: 65 MMHG | HEART RATE: 69 BPM | SYSTOLIC BLOOD PRESSURE: 118 MMHG

## 2021-10-14 DIAGNOSIS — E03.9 HYPOTHYROIDISM, UNSPECIFIED: ICD-10-CM

## 2021-10-14 DIAGNOSIS — Z98.891 HISTORY OF UTERINE SCAR FROM PREVIOUS SURGERY: ICD-10-CM

## 2021-10-14 DIAGNOSIS — Z01.818 ENCOUNTER FOR OTHER PREPROCEDURAL EXAMINATION: ICD-10-CM

## 2021-10-14 DIAGNOSIS — Z98.891 HISTORY OF UTERINE SCAR FROM PREVIOUS SURGERY: Chronic | ICD-10-CM

## 2021-10-14 DIAGNOSIS — O26.899 OTHER SPECIFIED PREGNANCY RELATED CONDITIONS, UNSPECIFIED TRIMESTER: ICD-10-CM

## 2021-10-14 DIAGNOSIS — Z3A.00 WEEKS OF GESTATION OF PREGNANCY NOT SPECIFIED: ICD-10-CM

## 2021-10-14 DIAGNOSIS — O24.419 GESTATIONAL DIABETES MELLITUS IN PREGNANCY, UNSPECIFIED CONTROL: ICD-10-CM

## 2021-10-14 LAB
A1C WITH ESTIMATED AVERAGE GLUCOSE RESULT: 5.5 % — SIGNIFICANT CHANGE UP (ref 4–5.6)
ANION GAP SERPL CALC-SCNC: 17 MMOL/L — SIGNIFICANT CHANGE UP (ref 5–17)
APPEARANCE UR: CLEAR — SIGNIFICANT CHANGE UP
BACTERIA # UR AUTO: ABNORMAL
BILIRUB UR-MCNC: NEGATIVE — SIGNIFICANT CHANGE UP
BLD GP AB SCN SERPL QL: NEGATIVE — SIGNIFICANT CHANGE UP
BUN SERPL-MCNC: 5 MG/DL — LOW (ref 7–23)
CALCIUM SERPL-MCNC: 8.4 MG/DL — SIGNIFICANT CHANGE UP (ref 8.4–10.5)
CHLORIDE SERPL-SCNC: 103 MMOL/L — SIGNIFICANT CHANGE UP (ref 96–108)
CO2 SERPL-SCNC: 18 MMOL/L — LOW (ref 22–31)
COLOR SPEC: SIGNIFICANT CHANGE UP
CREAT SERPL-MCNC: 0.51 MG/DL — SIGNIFICANT CHANGE UP (ref 0.5–1.3)
DIFF PNL FLD: NEGATIVE — SIGNIFICANT CHANGE UP
EPI CELLS # UR: 2 /HPF — SIGNIFICANT CHANGE UP
ESTIMATED AVERAGE GLUCOSE: 111 MG/DL — SIGNIFICANT CHANGE UP (ref 68–114)
GLUCOSE BLDC GLUCOMTR-MCNC: 72 MG/DL — SIGNIFICANT CHANGE UP (ref 70–99)
GLUCOSE SERPL-MCNC: 66 MG/DL — LOW (ref 70–99)
GLUCOSE UR QL: NEGATIVE — SIGNIFICANT CHANGE UP
HCT VFR BLD CALC: 31.7 % — LOW (ref 34.5–45)
HGB BLD-MCNC: 10.1 G/DL — LOW (ref 11.5–15.5)
HYALINE CASTS # UR AUTO: 0 /LPF — SIGNIFICANT CHANGE UP (ref 0–2)
KETONES UR-MCNC: ABNORMAL
LEUKOCYTE ESTERASE UR-ACNC: NEGATIVE — SIGNIFICANT CHANGE UP
MCHC RBC-ENTMCNC: 26.6 PG — LOW (ref 27–34)
MCHC RBC-ENTMCNC: 31.9 GM/DL — LOW (ref 32–36)
MCV RBC AUTO: 83.6 FL — SIGNIFICANT CHANGE UP (ref 80–100)
NITRITE UR-MCNC: NEGATIVE — SIGNIFICANT CHANGE UP
NRBC # BLD: 0 /100 WBCS — SIGNIFICANT CHANGE UP (ref 0–0)
PH UR: 6 — SIGNIFICANT CHANGE UP (ref 5–8)
PLATELET # BLD AUTO: 246 K/UL — SIGNIFICANT CHANGE UP (ref 150–400)
POTASSIUM SERPL-MCNC: 3.5 MMOL/L — SIGNIFICANT CHANGE UP (ref 3.5–5.3)
POTASSIUM SERPL-SCNC: 3.5 MMOL/L — SIGNIFICANT CHANGE UP (ref 3.5–5.3)
PROT UR-MCNC: NEGATIVE — SIGNIFICANT CHANGE UP
RBC # BLD: 3.79 M/UL — LOW (ref 3.8–5.2)
RBC # FLD: 13.6 % — SIGNIFICANT CHANGE UP (ref 10.3–14.5)
RBC CASTS # UR COMP ASSIST: 1 /HPF — SIGNIFICANT CHANGE UP (ref 0–4)
RH IG SCN BLD-IMP: POSITIVE — SIGNIFICANT CHANGE UP
SODIUM SERPL-SCNC: 138 MMOL/L — SIGNIFICANT CHANGE UP (ref 135–145)
SP GR SPEC: 1.01 — LOW (ref 1.01–1.02)
UROBILINOGEN FLD QL: NEGATIVE — SIGNIFICANT CHANGE UP
WBC # BLD: 9.3 K/UL — SIGNIFICANT CHANGE UP (ref 3.8–10.5)
WBC # FLD AUTO: 9.3 K/UL — SIGNIFICANT CHANGE UP (ref 3.8–10.5)
WBC UR QL: 1 /HPF — SIGNIFICANT CHANGE UP (ref 0–5)

## 2021-10-14 PROCEDURE — 86900 BLOOD TYPING SEROLOGIC ABO: CPT

## 2021-10-14 PROCEDURE — 81001 URINALYSIS AUTO W/SCOPE: CPT

## 2021-10-14 PROCEDURE — 80048 BASIC METABOLIC PNL TOTAL CA: CPT

## 2021-10-14 PROCEDURE — 82962 GLUCOSE BLOOD TEST: CPT

## 2021-10-14 PROCEDURE — 86850 RBC ANTIBODY SCREEN: CPT

## 2021-10-14 PROCEDURE — 86901 BLOOD TYPING SEROLOGIC RH(D): CPT

## 2021-10-14 PROCEDURE — G0463: CPT

## 2021-10-14 PROCEDURE — 87086 URINE CULTURE/COLONY COUNT: CPT

## 2021-10-14 PROCEDURE — 59025 FETAL NON-STRESS TEST: CPT

## 2021-10-14 PROCEDURE — 83036 HEMOGLOBIN GLYCOSYLATED A1C: CPT

## 2021-10-14 PROCEDURE — 85027 COMPLETE CBC AUTOMATED: CPT

## 2021-10-14 RX ORDER — CEPHALEXIN 500 MG
1 CAPSULE ORAL
Qty: 16 | Refills: 0
Start: 2021-10-14 | End: 2021-10-17

## 2021-10-14 RX ORDER — ACETAMINOPHEN 500 MG
975 TABLET ORAL ONCE
Refills: 0 | Status: COMPLETED | OUTPATIENT
Start: 2021-10-14 | End: 2021-10-14

## 2021-10-14 RX ORDER — SODIUM CHLORIDE 9 MG/ML
1000 INJECTION, SOLUTION INTRAVENOUS
Refills: 0 | Status: DISCONTINUED | OUTPATIENT
Start: 2021-10-14 | End: 2021-10-28

## 2021-10-14 RX ADMIN — Medication 975 MILLIGRAM(S): at 16:33

## 2021-10-14 NOTE — OB PROVIDER TRIAGE NOTE - NS_FETALHEARTRATE_OBGYN_ALL_OB_FT
Called and spoke with mother. Mother notes that there is improved swelling and pain. To continue to wait and watch. No gym or gymnastics this week. If pain and swelling continues until Friday, then RTC.   130

## 2021-10-14 NOTE — OB RN PATIENT PROFILE - ALERT: PERTINENT HISTORY
[PAT] : paroxysmal atrial tachycardia [Stable] : stable [Medication Changes Per Orders] : Medication changes are as documented in orders [Improving] : improving [Echocardiogram] : echocardiogram [de-identified] : neg ett 12/18, holter with PAT [de-identified] : johnathon [de-identified] : defers meds, decrease caffeine [de-identified] : not orthostatic [de-identified] : may need EP eval, ILR if reoccurs None

## 2021-10-14 NOTE — OB PROVIDER TRIAGE NOTE - NSOBPROVIDERNOTE_OBGYN_ALL_OB_FT
A/P:  35y  @37wks and 2 days gestation presenting with ctx's. +FM  -EFM/[place]  -IV fluids to be given  -Continue to monitor ctx pattern  -For repeat VE in a few hours  D/w Dr. Grayson Chan PA-C A/P:  35y  @37wks and 2 days gestation presenting with ctx's. +FM  -EFM/[place]  -IV fluids to be given  -Continue to monitor ctx pattern  -For repeat VE in a few hours  -Bedside sono confirms vertex presentation   D/w Dr. Grayson Chan PA-C A/P:  35y  @37wks and 2 days gestation presenting with ctx's. +FM  -EFM/[place]  -IV fluids to be given  -Continue to monitor ctx pattern  -For repeat VE in a few hours  -Bedside sono confirms vertex presentation   D/w Dr. Grayson PRADHAN-JT      patient seen and evaluated with LORNE Jaimes.     Pt is a  at 37w2d w h/o 2 prior LTCS p/w regular contractions. patient states she has been feeling cramping x 2 weeks but today cramping became more uncomfortable. denies sig abdominal pain, vaginal bleeding, LOF. reports AFM. feels uncomfortable tightening of abdomen. On arrival, pt was feeling abdominal tightening/palpable ctx q2 mins, received 2L IVF with spacing of ctx to q5min but no longer palpable. SVE by PA C/L/-3 x 3 over the course of evaluation. NST reactive/reassuring 130s/mod ruthie/+accel/no decels or variables. discussed no e/o  cervical dilation UA with + bacteria and UTI possible etiology for ctx. discussed that given resolution of palpable ctx, with no abd tenderness on exam (no rebound/guarding, soft, gravid) with reassuring fetal testing (BPP 8/8 by Dustin PRADHAN) and patient now comfortable, reasonable to discharge home with strict return precautions. At this time, no e/o more concerning etiology for ctx and after risk and benefits discussion with patient, patient would like to go home. reviewed strict return precautions/call back. Patient has scheduled f/u on Tuesday but will represent with any concerning sx. All questions answered to her stated satisfaction. Will txt empirically for UTI and f/u culture as well.

## 2021-10-14 NOTE — OB PROVIDER TRIAGE NOTE - NSHPPHYSICALEXAM_GEN_ALL_CORE
Vital Signs Last 24 Hrs  T(C): 36.7 (14 Oct 2021 12:28), Max: 36.7 (14 Oct 2021 12:08)  T(F): 98.1 (14 Oct 2021 12:28), Max: 98.1 (14 Oct 2021 12:28)  HR: 79 (14 Oct 2021 12:28) (69 - 79)  BP: 127/71 (14 Oct 2021 12:28) (117/70 - 127/71)  BP(mean): --  RR: 18 (14 Oct 2021 12:28) (16 - 18)  SpO2: 98% (14 Oct 2021 10:43) (98% - 98%)    General: Mildly uncomfortable with ctx's, A&Ox3  CV: RRR  Lungs: CTA b/l  Abdomen: Soft, NT, gravid

## 2021-10-14 NOTE — OB PROVIDER TRIAGE NOTE - HISTORY OF PRESENT ILLNESS
PA Note:  35y  @37wks and 2 days gestation presenting with ctx's. Patient was seen at pre-surgical testing today and was c/o ctx's q3-5m and was sent to L&D for evaluation. Patient states she has been dayan for 2wks and has always been closed on exam. She states today the ctx's are q3-5m and more severe than usual. She denies LOF or VB. PNC c/b GDMA1. +FM. GBS -. EFW 7#8 done by ultrasound 3wks ago. To note, pt is scheduled for rLTCS on 10/27.     POBHx: 2018-pLTCS for NRFHT, FT, 7#1, c/b PP anxiety-no meds. -rLTCS, FT, 7#. TOPx2. Ectopic x1 s/p MTX  PGYNHx: Denies fibroids, ovarian cysts, abnormal pap smears, STD's  PMHx: Hypothyroid  Medications: PNV, Synthroid 75mcg daily  Allergies: NKDA  PSHx: c/s x2  Social Hx: Denies etoh/tobacco/drug use     Vital Signs Last 24 Hrs  T(C): 36.7 (14 Oct 2021 12:28), Max: 36.7 (14 Oct 2021 12:08)  T(F): 98.1 (14 Oct 2021 12:28), Max: 98.1 (14 Oct 2021 12:28)  HR: 79 (14 Oct 2021 12:28) (69 - 79)  BP: 127/71 (14 Oct 2021 12:28) (117/70 - 127/71)  BP(mean): --  RR: 18 (14 Oct 2021 12:28) (16 - 18)  SpO2: 98% (14 Oct 2021 10:43) (98% - 98%)    General: Mildly uncomfortable with ctx's, A&Ox3  CV: RRR  Lungs: CTA b/l  Abdomen: Soft, NT, gravid    VE: 0/long/-3  EFM: 130/moderate variability/+accels/no decels  [place]: q2-4m

## 2021-10-14 NOTE — OB PST NOTE - NSHPPHYSICALEXAM_GEN_ALL_CORE
PHYSICAL EXAM:      Constitutional: Alert O X 3    Eyes: PERRLA    ENMT: WNL    Neck: supple, no JVD    Breasts: not examined    Back: WNL    Respiratory: CTA bilaterally    Cardiovascular: S1S2 reg    Gastrointestinal: soft, +BS    Genitourinary: refused    Rectal: Refused    Extremities: no edema, +PP    Vascular: WNL    Neurological: WNL    Skin: W&D    Lymph Nodes: none palpable    Musculoskeletal: WNL    Psychiatric: anxious

## 2021-10-14 NOTE — OB PST NOTE - HISTORY OF PRESENT ILLNESS
36 yo F  presenting @ 38 weeks of gestation scheduled for repeat  w/bilateral salpingectomy on 10/27/21.  Pt denies any fever, chills, recent travel or Covid 19 related infections  **Covid 19 PCR swab scheduled on 10/25/21  **Pt c/o lower abdominal pain/pressure & nausea. L&D informed, pt sent to L&D

## 2021-10-14 NOTE — OB RN PATIENT PROFILE - AS SC BRADEN SENSORY
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 Open reduction internal fixation unstable OCD lesion right knee.  Date of surgery 9/4/2019    HISTORY:  Has been doing very well since surgery until the last week.  He is never noted an onset of knee pain it happens when he twists.  Is been going on for last week.  He also describes a small catch in his knee.  He is not exactly sure what this is.  But it is a step back from where he was before.  He had previously been very happy with his progress.  He is now 6 months out from surgery and we have been making plans for elective screw removal.  He has been planning on this.    EXAM:  No examination was completed today as this was a phone interview.  The patient reports no problems with healing      IMAGING:  No new imaging is available    ASSESSMENT:  1. 6 months status post fixation of an OCD lesion right knee.  Doing well with the exception of a small catch that is been going on for the last week    PLAN:   Weightbearing as tolerated  Range of motion as tolerated  At this time I am going to get a x-ray of his knee.  We will set this to be done as an outpatient I will plan to review the results.  The order is been put in the computer.  If it shows migration of the screws we may need to consider emergent or urgent screw removal.  If it shows that the screws are not changing position we will continue to treat him symptomatically and plan for elective screw removal at his desired time point once resources become available.    This visit was completed over the telephone secondary to the ongoing recommendations from the [facility], [place] Department of Health and our organization about limiting in person visits secondary to the Covid-19 pandemic. I spoke with the patient on the phone and had a medical discussion for 15 minutes.     (4) no impairment

## 2021-10-15 ENCOUNTER — TRANSCRIPTION ENCOUNTER (OUTPATIENT)
Age: 36
End: 2021-10-15

## 2021-10-15 LAB
CULTURE RESULTS: SIGNIFICANT CHANGE UP
SPECIMEN SOURCE: SIGNIFICANT CHANGE UP

## 2021-10-16 ENCOUNTER — TRANSCRIPTION ENCOUNTER (OUTPATIENT)
Age: 36
End: 2021-10-16

## 2021-10-16 ENCOUNTER — INPATIENT (INPATIENT)
Facility: HOSPITAL | Age: 36
LOS: 2 days | Discharge: HOME | End: 2021-10-19
Attending: OBSTETRICS & GYNECOLOGY | Admitting: OBSTETRICS & GYNECOLOGY
Payer: COMMERCIAL

## 2021-10-16 VITALS
SYSTOLIC BLOOD PRESSURE: 125 MMHG | TEMPERATURE: 98 F | OXYGEN SATURATION: 100 % | RESPIRATION RATE: 18 BRPM | HEART RATE: 82 BPM | DIASTOLIC BLOOD PRESSURE: 73 MMHG

## 2021-10-16 DIAGNOSIS — Z98.891 HISTORY OF UTERINE SCAR FROM PREVIOUS SURGERY: Chronic | ICD-10-CM

## 2021-10-16 DIAGNOSIS — O26.899 OTHER SPECIFIED PREGNANCY RELATED CONDITIONS, UNSPECIFIED TRIMESTER: ICD-10-CM

## 2021-10-16 DIAGNOSIS — Z3A.00 WEEKS OF GESTATION OF PREGNANCY NOT SPECIFIED: ICD-10-CM

## 2021-10-16 DIAGNOSIS — Z34.80 ENCOUNTER FOR SUPERVISION OF OTHER NORMAL PREGNANCY, UNSPECIFIED TRIMESTER: ICD-10-CM

## 2021-10-16 LAB
BASOPHILS # BLD AUTO: 0.01 K/UL — SIGNIFICANT CHANGE UP (ref 0–0.2)
BASOPHILS NFR BLD AUTO: 0.1 % — SIGNIFICANT CHANGE UP (ref 0–2)
BLD GP AB SCN SERPL QL: NEGATIVE — SIGNIFICANT CHANGE UP
EOSINOPHIL # BLD AUTO: 0.06 K/UL — SIGNIFICANT CHANGE UP (ref 0–0.5)
EOSINOPHIL NFR BLD AUTO: 0.6 % — SIGNIFICANT CHANGE UP (ref 0–6)
GLUCOSE BLDC GLUCOMTR-MCNC: 68 MG/DL — LOW (ref 70–99)
HCT VFR BLD CALC: 32.9 % — LOW (ref 34.5–45)
HGB BLD-MCNC: 10.3 G/DL — LOW (ref 11.5–15.5)
IMM GRANULOCYTES NFR BLD AUTO: 0.6 % — SIGNIFICANT CHANGE UP (ref 0–1.5)
LYMPHOCYTES # BLD AUTO: 2.32 K/UL — SIGNIFICANT CHANGE UP (ref 1–3.3)
LYMPHOCYTES # BLD AUTO: 22 % — SIGNIFICANT CHANGE UP (ref 13–44)
MCHC RBC-ENTMCNC: 26.3 PG — LOW (ref 27–34)
MCHC RBC-ENTMCNC: 31.3 GM/DL — LOW (ref 32–36)
MCV RBC AUTO: 84.1 FL — SIGNIFICANT CHANGE UP (ref 80–100)
MONOCYTES # BLD AUTO: 0.69 K/UL — SIGNIFICANT CHANGE UP (ref 0–0.9)
MONOCYTES NFR BLD AUTO: 6.5 % — SIGNIFICANT CHANGE UP (ref 2–14)
NEUTROPHILS # BLD AUTO: 7.4 K/UL — SIGNIFICANT CHANGE UP (ref 1.8–7.4)
NEUTROPHILS NFR BLD AUTO: 70.2 % — SIGNIFICANT CHANGE UP (ref 43–77)
NRBC # BLD: 0 /100 WBCS — SIGNIFICANT CHANGE UP (ref 0–0)
PLATELET # BLD AUTO: 248 K/UL — SIGNIFICANT CHANGE UP (ref 150–400)
RBC # BLD: 3.91 M/UL — SIGNIFICANT CHANGE UP (ref 3.8–5.2)
RBC # FLD: 13.8 % — SIGNIFICANT CHANGE UP (ref 10.3–14.5)
RH IG SCN BLD-IMP: POSITIVE — SIGNIFICANT CHANGE UP
SARS-COV-2 RNA SPEC QL NAA+PROBE: SIGNIFICANT CHANGE UP
WBC # BLD: 10.54 K/UL — HIGH (ref 3.8–10.5)
WBC # FLD AUTO: 10.54 K/UL — HIGH (ref 3.8–10.5)

## 2021-10-16 PROCEDURE — 88307 TISSUE EXAM BY PATHOLOGIST: CPT | Mod: 26

## 2021-10-16 PROCEDURE — 88302 TISSUE EXAM BY PATHOLOGIST: CPT | Mod: 26

## 2021-10-16 RX ORDER — MORPHINE SULFATE 50 MG/1
0.1 CAPSULE, EXTENDED RELEASE ORAL ONCE
Refills: 0 | Status: DISCONTINUED | OUTPATIENT
Start: 2021-10-16 | End: 2021-10-17

## 2021-10-16 RX ORDER — OXYCODONE HYDROCHLORIDE 5 MG/1
10 TABLET ORAL
Refills: 0 | Status: DISCONTINUED | OUTPATIENT
Start: 2021-10-16 | End: 2021-10-16

## 2021-10-16 RX ORDER — OXYTOCIN 10 UNIT/ML
333.33 VIAL (ML) INJECTION
Qty: 20 | Refills: 0 | Status: DISCONTINUED | OUTPATIENT
Start: 2021-10-16 | End: 2021-10-19

## 2021-10-16 RX ORDER — DEXAMETHASONE 0.5 MG/5ML
4 ELIXIR ORAL EVERY 6 HOURS
Refills: 0 | Status: DISCONTINUED | OUTPATIENT
Start: 2021-10-16 | End: 2021-10-19

## 2021-10-16 RX ORDER — SODIUM CHLORIDE 9 MG/ML
1000 INJECTION, SOLUTION INTRAVENOUS
Refills: 0 | Status: DISCONTINUED | OUTPATIENT
Start: 2021-10-16 | End: 2021-10-17

## 2021-10-16 RX ORDER — NALBUPHINE HYDROCHLORIDE 10 MG/ML
2.5 INJECTION, SOLUTION INTRAMUSCULAR; INTRAVENOUS; SUBCUTANEOUS EVERY 6 HOURS
Refills: 0 | Status: DISCONTINUED | OUTPATIENT
Start: 2021-10-16 | End: 2021-10-19

## 2021-10-16 RX ORDER — CITRIC ACID/SODIUM CITRATE 300-500 MG
15 SOLUTION, ORAL ORAL EVERY 6 HOURS
Refills: 0 | Status: DISCONTINUED | OUTPATIENT
Start: 2021-10-16 | End: 2021-10-17

## 2021-10-16 RX ORDER — ONDANSETRON 8 MG/1
4 TABLET, FILM COATED ORAL EVERY 6 HOURS
Refills: 0 | Status: DISCONTINUED | OUTPATIENT
Start: 2021-10-16 | End: 2021-10-19

## 2021-10-16 RX ORDER — OXYCODONE HYDROCHLORIDE 5 MG/1
5 TABLET ORAL
Refills: 0 | Status: DISCONTINUED | OUTPATIENT
Start: 2021-10-16 | End: 2021-10-16

## 2021-10-16 RX ORDER — NALOXONE HYDROCHLORIDE 4 MG/.1ML
0.1 SPRAY NASAL
Refills: 0 | Status: DISCONTINUED | OUTPATIENT
Start: 2021-10-16 | End: 2021-10-19

## 2021-10-16 NOTE — OB RN INTRAOPERATIVE NOTE - NSSELHIDDEN_OBGYN_ALL_OB_FT
[NS_DeliveryAttending1_OBGYN_ALL_OB_FT:[id number]==],[NS_DeliveryRN_OBGYN_ALL_OB_FT:[id number]] [NS_DeliveryAttending1_OBGYN_ALL_OB_FT:[id number]],[NS_DeliveryRN_OBGYN_ALL_OB_FT:[id number]==],[NS_DeliveryAssist1_OBGYN_ALL_OB_FT:[id number]]

## 2021-10-16 NOTE — DISCHARGE NOTE OB - HOSPITAL COURSE
Pt admitted  uncomplicated section - bilateral salpingectomy  Normal post op course  VSS and afebrile  incision healing well  min lochia  d/c home pod3  instructions given - pain meds reviewed  f/u 2 weeks for post op check

## 2021-10-16 NOTE — DISCHARGE NOTE OB - PATIENT PORTAL LINK FT
You can access the FollowMyHealth Patient Portal offered by [facility] by registering at the following website: [email or web address]. By joining [facility]’s FollowMyHealth portal, you will also be able to view your health information using other applications (apps) compatible with our system.

## 2021-10-16 NOTE — OB PROVIDER DELIVERY SUMMARY - NSPROVIDERDELIVERYNOTE_OBGYN_ALL_OB_FT
dense adhesions noted in the pelvis - centrally located - retrograde fill of bladder  bladder taken down with sharp and bovie dissection  normal uterus. tubes and ovaries  surgicell powder used  bilateral salpingectomy  EBL 1L  IVF 2L  uo 400cc  boy 8lb 5oz APGARS 7-8 Peds present, stable to NICU  counts correct  2grams ancef

## 2021-10-16 NOTE — OB PROVIDER DELIVERY SUMMARY - NSATTENDATTESTATION_OBGYN_A_OB
Certified letter receipt received signed.  Original slip sent to scanning
Termination letter mailed    Article number [id number] [phone]
I was physically present and participated in this delivery.

## 2021-10-16 NOTE — DISCHARGE NOTE OB - ADDITIONAL INSTRUCTIONS
Please call if you have fever, pain uncontrolled with pain medicine, signs of wound infection, heavy vaginal bleeding

## 2021-10-16 NOTE — OB PROVIDER TRIAGE NOTE - NS_GESTAGE_OBGYN_ALL_OB_FT
SUBJECTIVE:  The patient is an 89-year-old white male.  He has hyperlipidemia.  He has arthritis and has had prostate cancer.  He is doing fairly well.  He is complaining of arthritis in his back but it is bearable since he sees a chiropractor.    PAST MEDICAL HISTORY:  Reviewed.    REVIEW OF SYSTEMS:  Please see above; all others reviewed and are negative.      OBJECTIVE:   Vitals signs are reviewed and are stable.  BMI 29  General:  Well-nourished.  Alert and oriented x3 in no acute distress.  HEENT: PERRLA.   Neck:  Supple.   Lungs:  Clear.    Heart:  Regular rate and rhythm.   Abdomen:   Soft, nontender.   Extremities:  No cyanosis, clubbing or edema.   Neurological:  Grossly intact without motor or sensory deficits.     ASSESSMENT:    Arthritis  Hyperlipidemia  PLAN: CMP fasting lipid CBC ordered.  Follow-up on labs.  Healthy lifestyle discussed.    Dictated utilizing Dragon dictation.  
37w4d

## 2021-10-16 NOTE — PRE-ANESTHESIA EVALUATION ADULT - NSANTHPMHFT_GEN_ALL_CORE
No cardiac or pulmonary disease  No bleeding or clotting disorders  No issues with prior anesthetics

## 2021-10-16 NOTE — OB PROVIDER TRIAGE NOTE - NSHPPHYSICALEXAM_GEN_ALL_CORE
T(C): 36.8 (10-16-21 @ 16:59), Max: 36.8 (10-16-21 @ 16:59)  HR: 80 (10-16-21 @ 18:15) (71 - 88)  BP: 125/73 (10-16-21 @ 17:05) (125/73 - 125/73)  RR: 18 (10-16-21 @ 16:59) (18 - 18)  SpO2: 99% (10-16-21 @ 18:15) (97% - 100%)    General: Mildly uncomfortable with ctx's, A&Ox3  CV: RRR  Lungs: CTA b/l  Abdomen: Soft, NT, gravid    VE: 0/long/-3  EFM: 135/moderate variability/+accels/no decels  [place]: q2-4m  Sono: vtx, anterior left placenta, BPP 8/8, ARLEEN 7.91 T(C): 36.8 (10-16-21 @ 16:59), Max: 36.8 (10-16-21 @ 16:59)  HR: 80 (10-16-21 @ 18:15) (71 - 88)  BP: 125/73 (10-16-21 @ 17:05) (125/73 - 125/73)  RR: 18 (10-16-21 @ 16:59) (18 - 18)  SpO2: 99% (10-16-21 @ 18:15) (97% - 100%)    General: Mildly uncomfortable with ctx's, A&Ox3  CV: RRR  Lungs: CTA b/l  Abdomen: Soft, NT, gravid    VE: 0/long/-3  EFM: 135/moderate variability/+accels/no decels  [place]: q2-4m  Sono: vtx, anterior left placenta, BPP 8/8, ARLEEN 7.91, lower uterine segment found to be intact, no window seen, 4-5mm.  Sono supervised by KIRIT Hunter M Fellow

## 2021-10-16 NOTE — DISCHARGE NOTE OB - CARE PROVIDER_API CALL
Cathy Bah)  Obstetrics and Gynecology  [address], [address]  [place] [id number]  Phone: [phone]  Fax: [phone]  Follow Up Time:

## 2021-10-16 NOTE — BRIEF OPERATIVE NOTE - NSICDXBRIEFPROCEDURE_GEN_ALL_CORE_FT
PROCEDURES:  Repeat  section 16-Oct-2021 23:43:29  Cathy Bah  Bilateral salpingectomy 16-Oct-2021 23:43:41  Cathy Bah

## 2021-10-16 NOTE — OB PROVIDER DELIVERY SUMMARY - NSSELHIDDEN_OBGYN_ALL_OB_FT
[NS_DeliveryAttending1_OBGYN_ALL_OB_FT:[id number]==],[NS_DeliveryRN_OBGYN_ALL_OB_FT:[id number]==]
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
## 2021-10-16 NOTE — OB RN TRIAGE NOTE - NS_OBGYNHISTORY_OBGYN_ALL_OB_FT
gdm diet controlled  x2 c/s (1st-emergent)  hypothyroidism- 75mcg synthroid  on keflex for UTI since thurs.

## 2021-10-16 NOTE — OB NEONATOLOGY/PEDIATRICIAN DELIVERY SUMMARY - NSPEDSNEONOTESA_OBGYN_ALL_OB_FT
Baby is a 37.4 GA male born to a 34yo  via repeat unscheduled C/S. Maternal hx notable for GDMA1, hypothyroidism on Synthroid, mom being treated for UTI (although UCx negative). MBT O+. PNL neg/nr/imm. GBS neg . AROM at delivery w/ clear fluids. Covid negative. Baby born vigorous and crying spontaneously. DCC x1 minute. Was WDSS, but at 8MOL retracting, flaring, placed on CPAP 5 (max FiO2 40%), unable to wean off CPAP, and brought to nicu on CPAP 5 30% for likely TTN and continued management. Apgars 7/8. EOS: N/A due to non-rupture. Mother afebrile.   mom wants to breastfeed, wants hepB, wants circ

## 2021-10-16 NOTE — DISCHARGE NOTE OB - MEDICATION SUMMARY - MEDICATIONS TO STOP TAKING
I will STOP taking the medications listed below when I get home from the hospital:    cephalexin 500 mg oral tablet  -- 1 tab(s) by mouth every 6 hours   -- Finish all this medication unless otherwise directed by prescriber.

## 2021-10-16 NOTE — DISCHARGE NOTE OB - CARE PLAN
1 Principal Discharge DX:	Delivery of third pregnancy by  section  Assessment and plan of treatment:	pain control, no heavy lifting or exercise

## 2021-10-16 NOTE — OB NEONATOLOGY/PEDIATRICIAN DELIVERY SUMMARY - BABY A: APGAR 5 MIN MUSCLE TONE, DELIVERY
----- Message from Jeremi Magana Ass't sent at 9/24/2019 10:45 AM PDT -----  Regarding: FW: Prescription Question  Contact: [phone]      ----- Message -----  From: Jennifer Gee  Sent: 9/19/2019   9:38 AM PDT  To: Neurology [facility]  Subject: Prescription Question                            Hello, my insurance said the tens unit is denied because it was coded wrong on the prescription. I don’t know what else to do. Can you take a look and see about this mess for me when you get the chance. Thank you for all your help.   (1) flexion of extremities

## 2021-10-16 NOTE — OB PROVIDER TRIAGE NOTE - HISTORY OF PRESENT ILLNESS
35y  @37+4 wga presenting with diarrhea and nausea for the past day in the setting of receiving Keflex for suspected UTI when she was seen in triage Thursday. Patient continues to have q3 min contractions she rates 7/10 pain. She denies LOF or VB. PNC c/b GDMA1 well controlled. +FM. GBS -. EFW 7#8 done by ultrasound 3wks ago. Pt is scheduled for rLTCS on 10/27.     POBHx: 2018-pLTCS for NRFHT, FT, 7#1, c/b PP anxiety-no meds. -rLTCS, FT, 7#. TOPx2. Ectopic x1 s/p MTX  PGYNHx: Denies fibroids, ovarian cysts, abnormal pap smears, STD's  PMHx: Hypothyroid  Medications: PNV, Synthroid 75mcg daily  Allergies: NKDA  PSHx: c/s x2  Social Hx: Denies etoh/tobacco/drug use      35y  @37+4 wga presenting with diarrhea and nausea for the past day in the setting of receiving Keflex for suspected UTI when she was seen in triage Thursday. Of note, urine culture from 10/14 is clean. Patient continues to have q3 min contractions she rates 7/10 pain. She denies LOF or VB. PNC c/b GDMA1 well controlled. +FM. GBS -. EFW 7#8 done by ultrasound 3wks ago. Pt is scheduled for rLTCS on 10/27.     POBHx: 2018-pLTCS for NRFHT, FT, 7#1, c/b PP anxiety-no meds. 2020-rLTCS, FT, 7#. TOPx2. Ectopic x1 s/p MTX  PGYNHx: Denies fibroids, ovarian cysts, abnormal pap smears, STD's  PMHx: Hypothyroid  Medications: PNV, Synthroid 75mcg daily  Allergies: NKDA  PSHx: c/s x2  Social Hx: Denies etoh/tobacco/drug use

## 2021-10-16 NOTE — OB PROVIDER TRIAGE NOTE - NSOBPROVIDERNOTE_OBGYN_ALL_OB_FT
35y  @37+2 wga presenting with nausea and diarrhea for one day and continues to have q3 CTX with unchanged cervical exam.     -EFM/[place]  -IV fluids to be given  -For repeat VE in a few hours  - for rLTCS 10/27    D/w Dr. Niurka Norris PGY1 35y  @37+2 wga presenting with nausea and diarrhea for one day and continues to have q3 CTX with unchanged cervical exam.     - EFM/[place]  - BPP , Reactive NST  - encourage po hydration  - For repeat VE in a few hours  - for rLTCS 10/27  - d/c Keflex    D/w Dr. Niurka Norris PGY1 35y  @37+2 wga presenting with nausea and diarrhea for one day and continues to have q3 CTX with unchanged cervical exam.     - EFM/[place]  - BPP , Reactive NST  - encourage po hydration  - For repeat VE in a few hours  - for rLTCS 10/27  - d/c Keflex    D/w Dr. Bah    OB attending addendum  P2 at 37+ weeks with cervical change confirmed early labor  fetal status reassuring  pt more uncomfortable   consents obtained   NPO since 10am  going to OR now  Cathy Norris PGY1

## 2021-10-16 NOTE — BRIEF OPERATIVE NOTE - OPERATION/FINDINGS
normal uterus tubes and ovaries  boy cephalic peds present for delivery spontaneous cry and good tone on delivery   stable to nicu apgars 7/8 weight 8lb 5oz  EBL 1L  IVF 2L  uo 400cc  2grams ancef  tubes and placenta to pathology

## 2021-10-16 NOTE — DISCHARGE NOTE OB - MEDICATION SUMMARY - MEDICATIONS TO TAKE
I will START or STAY ON the medications listed below when I get home from the hospital:    acetaminophen 325 mg oral tablet  -- 3 tab(s) by mouth   -- Indication: For pain control    ibuprofen 600 mg oral tablet  -- 1 tab(s) by mouth every 6 hours  -- Indication: For pain    oxyCODONE 5 mg oral tablet  -- 1 tab(s) by mouth every 3 hours, As needed, Moderate to Severe Pain (4-10)  -- Indication: For pain

## 2021-10-17 PROBLEM — E03.9 HYPOTHYROIDISM, UNSPECIFIED: Chronic | Status: ACTIVE | Noted: 2021-10-14

## 2021-10-17 PROBLEM — O24.419 GESTATIONAL DIABETES MELLITUS IN PREGNANCY, UNSPECIFIED CONTROL: Chronic | Status: ACTIVE | Noted: 2021-10-14

## 2021-10-17 LAB
BASOPHILS # BLD AUTO: 0.01 K/UL — SIGNIFICANT CHANGE UP (ref 0–0.2)
BASOPHILS NFR BLD AUTO: 0.1 % — SIGNIFICANT CHANGE UP (ref 0–2)
COVID-19 SPIKE DOMAIN AB INTERP: POSITIVE
COVID-19 SPIKE DOMAIN ANTIBODY RESULT: 104 U/ML — HIGH
EOSINOPHIL # BLD AUTO: 0 K/UL — SIGNIFICANT CHANGE UP (ref 0–0.5)
EOSINOPHIL NFR BLD AUTO: 0 % — SIGNIFICANT CHANGE UP (ref 0–6)
HCT VFR BLD CALC: 31.7 % — LOW (ref 34.5–45)
HGB BLD-MCNC: 10.3 G/DL — LOW (ref 11.5–15.5)
IMM GRANULOCYTES NFR BLD AUTO: 0.5 % — SIGNIFICANT CHANGE UP (ref 0–1.5)
KLEIHAUER-BETKE CALCULATION: 0.2 % — SIGNIFICANT CHANGE UP (ref 0–0.3)
LYMPHOCYTES # BLD AUTO: 1.11 K/UL — SIGNIFICANT CHANGE UP (ref 1–3.3)
LYMPHOCYTES # BLD AUTO: 8.4 % — LOW (ref 13–44)
MCHC RBC-ENTMCNC: 27.3 PG — SIGNIFICANT CHANGE UP (ref 27–34)
MCHC RBC-ENTMCNC: 32.5 GM/DL — SIGNIFICANT CHANGE UP (ref 32–36)
MCV RBC AUTO: 84.1 FL — SIGNIFICANT CHANGE UP (ref 80–100)
MONOCYTES # BLD AUTO: 0.68 K/UL — SIGNIFICANT CHANGE UP (ref 0–0.9)
MONOCYTES NFR BLD AUTO: 5.2 % — SIGNIFICANT CHANGE UP (ref 2–14)
NEUTROPHILS # BLD AUTO: 11.32 K/UL — HIGH (ref 1.8–7.4)
NEUTROPHILS NFR BLD AUTO: 85.8 % — HIGH (ref 43–77)
NRBC # BLD: 0 /100 WBCS — SIGNIFICANT CHANGE UP (ref 0–0)
PLATELET # BLD AUTO: 241 K/UL — SIGNIFICANT CHANGE UP (ref 150–400)
RBC # BLD: 3.77 M/UL — LOW (ref 3.8–5.2)
RBC # FLD: 13.5 % — SIGNIFICANT CHANGE UP (ref 10.3–14.5)
SARS-COV-2 IGG+IGM SERPL QL IA: 104 U/ML — HIGH
SARS-COV-2 IGG+IGM SERPL QL IA: POSITIVE
T PALLIDUM AB TITR SER: NEGATIVE — SIGNIFICANT CHANGE UP
WBC # BLD: 13.18 K/UL — HIGH (ref 3.8–10.5)
WBC # FLD AUTO: 13.18 K/UL — HIGH (ref 3.8–10.5)

## 2021-10-17 RX ORDER — OXYCODONE HYDROCHLORIDE 5 MG/1
5 TABLET ORAL ONCE
Refills: 0 | Status: DISCONTINUED | OUTPATIENT
Start: 2021-10-17 | End: 2021-10-19

## 2021-10-17 RX ORDER — OXYTOCIN 10 UNIT/ML
333.33 VIAL (ML) INJECTION
Qty: 20 | Refills: 0 | Status: DISCONTINUED | OUTPATIENT
Start: 2021-10-17 | End: 2021-10-19

## 2021-10-17 RX ORDER — OXYCODONE HYDROCHLORIDE 5 MG/1
5 TABLET ORAL
Refills: 0 | Status: DISCONTINUED | OUTPATIENT
Start: 2021-10-17 | End: 2021-10-19

## 2021-10-17 RX ORDER — LANOLIN
1 OINTMENT (GRAM) TOPICAL EVERY 6 HOURS
Refills: 0 | Status: DISCONTINUED | OUTPATIENT
Start: 2021-10-17 | End: 2021-10-19

## 2021-10-17 RX ORDER — ACETAMINOPHEN 500 MG
975 TABLET ORAL
Refills: 0 | Status: DISCONTINUED | OUTPATIENT
Start: 2021-10-17 | End: 2021-10-19

## 2021-10-17 RX ORDER — LEVOTHYROXINE SODIUM 125 MCG
75 TABLET ORAL DAILY
Refills: 0 | Status: DISCONTINUED | OUTPATIENT
Start: 2021-10-17 | End: 2021-10-19

## 2021-10-17 RX ORDER — MAGNESIUM HYDROXIDE 400 MG/1
30 TABLET, CHEWABLE ORAL
Refills: 0 | Status: DISCONTINUED | OUTPATIENT
Start: 2021-10-17 | End: 2021-10-19

## 2021-10-17 RX ORDER — TETANUS TOXOID, REDUCED DIPHTHERIA TOXOID AND ACELLULAR PERTUSSIS VACCINE, ADSORBED 5; 2.5; 8; 8; 2.5 [IU]/.5ML; [IU]/.5ML; UG/.5ML; UG/.5ML; UG/.5ML
0.5 SUSPENSION INTRAMUSCULAR ONCE
Refills: 0 | Status: DISCONTINUED | OUTPATIENT
Start: 2021-10-17 | End: 2021-10-19

## 2021-10-17 RX ORDER — SIMETHICONE 80 MG/1
80 TABLET, CHEWABLE ORAL EVERY 4 HOURS
Refills: 0 | Status: DISCONTINUED | OUTPATIENT
Start: 2021-10-17 | End: 2021-10-19

## 2021-10-17 RX ORDER — HEPARIN SODIUM 5000 [USP'U]/ML
5000 INJECTION INTRAVENOUS; SUBCUTANEOUS EVERY 12 HOURS
Refills: 0 | Status: DISCONTINUED | OUTPATIENT
Start: 2021-10-17 | End: 2021-10-19

## 2021-10-17 RX ORDER — SODIUM CHLORIDE 9 MG/ML
1000 INJECTION, SOLUTION INTRAVENOUS
Refills: 0 | Status: DISCONTINUED | OUTPATIENT
Start: 2021-10-17 | End: 2021-10-19

## 2021-10-17 RX ORDER — KETOROLAC TROMETHAMINE 30 MG/ML
30 SYRINGE (ML) INJECTION EVERY 6 HOURS
Refills: 0 | Status: DISCONTINUED | OUTPATIENT
Start: 2021-10-17 | End: 2021-10-17

## 2021-10-17 RX ORDER — IBUPROFEN 200 MG
600 TABLET ORAL EVERY 6 HOURS
Refills: 0 | Status: COMPLETED | OUTPATIENT
Start: 2021-10-17 | End: 2022-09-15

## 2021-10-17 RX ORDER — DIPHENHYDRAMINE HCL 50 MG
25 CAPSULE ORAL EVERY 6 HOURS
Refills: 0 | Status: COMPLETED | OUTPATIENT
Start: 2021-10-17 | End: 2022-09-15

## 2021-10-17 RX ORDER — DIPHENHYDRAMINE HCL 50 MG
25 CAPSULE ORAL EVERY 6 HOURS
Refills: 0 | Status: DISCONTINUED | OUTPATIENT
Start: 2021-10-17 | End: 2021-10-19

## 2021-10-17 RX ADMIN — Medication 975 MILLIGRAM(S): at 18:19

## 2021-10-17 RX ADMIN — Medication 30 MILLIGRAM(S): at 21:55

## 2021-10-17 RX ADMIN — Medication 975 MILLIGRAM(S): at 07:00

## 2021-10-17 RX ADMIN — Medication 30 MILLIGRAM(S): at 04:15

## 2021-10-17 RX ADMIN — HEPARIN SODIUM 5000 UNIT(S): 5000 INJECTION INTRAVENOUS; SUBCUTANEOUS at 18:18

## 2021-10-17 RX ADMIN — ONDANSETRON 4 MILLIGRAM(S): 8 TABLET, FILM COATED ORAL at 04:39

## 2021-10-17 RX ADMIN — SIMETHICONE 80 MILLIGRAM(S): 80 TABLET, CHEWABLE ORAL at 21:27

## 2021-10-17 RX ADMIN — Medication 30 MILLIGRAM(S): at 16:56

## 2021-10-17 RX ADMIN — Medication 975 MILLIGRAM(S): at 13:30

## 2021-10-17 RX ADMIN — SIMETHICONE 80 MILLIGRAM(S): 80 TABLET, CHEWABLE ORAL at 13:11

## 2021-10-17 RX ADMIN — HEPARIN SODIUM 5000 UNIT(S): 5000 INJECTION INTRAVENOUS; SUBCUTANEOUS at 06:50

## 2021-10-17 RX ADMIN — Medication 30 MILLIGRAM(S): at 11:05

## 2021-10-17 RX ADMIN — SIMETHICONE 80 MILLIGRAM(S): 80 TABLET, CHEWABLE ORAL at 04:15

## 2021-10-17 RX ADMIN — Medication 75 MICROGRAM(S): at 06:49

## 2021-10-17 RX ADMIN — Medication 975 MILLIGRAM(S): at 12:55

## 2021-10-17 RX ADMIN — Medication 30 MILLIGRAM(S): at 21:25

## 2021-10-17 RX ADMIN — Medication 975 MILLIGRAM(S): at 06:49

## 2021-10-17 RX ADMIN — Medication 975 MILLIGRAM(S): at 18:51

## 2021-10-17 RX ADMIN — Medication 25 MILLIGRAM(S): at 04:16

## 2021-10-17 RX ADMIN — Medication 30 MILLIGRAM(S): at 10:42

## 2021-10-17 RX ADMIN — Medication 30 MILLIGRAM(S): at 05:15

## 2021-10-17 RX ADMIN — Medication 30 MILLIGRAM(S): at 16:13

## 2021-10-17 NOTE — OB RN DELIVERY SUMMARY - NS_SEPSISRSKCALC_OBGYN_ALL_OB_FT
EOS calculated successfully. EOS Risk Factor: 0.5/1000 live births ([facility] national incidence); GA=37w4d; Temp=98.2; ROM=1.05; GBS='Negative'; Antibiotics='No antibiotics or any antibiotics < 2 hrs prior to birth'

## 2021-10-17 NOTE — OB RN DELIVERY SUMMARY - NSSELHIDDEN_OBGYN_ALL_OB_FT
[NS_DeliveryAttending1_OBGYN_ALL_OB_FT:[id number]==],[NS_DeliveryRN_OBGYN_ALL_OB_FT:[id number]],[NS_DeliveryAssist1_OBGYN_ALL_OB_FT:[id number]]

## 2021-10-18 ENCOUNTER — APPOINTMENT (OUTPATIENT)
Dept: MATERNAL FETAL MEDICINE | Facility: CLINIC | Age: 36
End: 2021-10-18

## 2021-10-18 RX ORDER — IBUPROFEN 200 MG
600 TABLET ORAL EVERY 6 HOURS
Refills: 0 | Status: DISCONTINUED | OUTPATIENT
Start: 2021-10-18 | End: 2021-10-19

## 2021-10-18 RX ADMIN — Medication 975 MILLIGRAM(S): at 18:38

## 2021-10-18 RX ADMIN — Medication 975 MILLIGRAM(S): at 13:26

## 2021-10-18 RX ADMIN — Medication 975 MILLIGRAM(S): at 00:03

## 2021-10-18 RX ADMIN — Medication 975 MILLIGRAM(S): at 12:53

## 2021-10-18 RX ADMIN — Medication 600 MILLIGRAM(S): at 04:30

## 2021-10-18 RX ADMIN — Medication 600 MILLIGRAM(S): at 15:30

## 2021-10-18 RX ADMIN — Medication 600 MILLIGRAM(S): at 10:16

## 2021-10-18 RX ADMIN — Medication 975 MILLIGRAM(S): at 06:28

## 2021-10-18 RX ADMIN — SIMETHICONE 80 MILLIGRAM(S): 80 TABLET, CHEWABLE ORAL at 06:29

## 2021-10-18 RX ADMIN — HEPARIN SODIUM 5000 UNIT(S): 5000 INJECTION INTRAVENOUS; SUBCUTANEOUS at 18:38

## 2021-10-18 RX ADMIN — Medication 600 MILLIGRAM(S): at 09:46

## 2021-10-18 RX ADMIN — HEPARIN SODIUM 5000 UNIT(S): 5000 INJECTION INTRAVENOUS; SUBCUTANEOUS at 06:29

## 2021-10-18 RX ADMIN — Medication 75 MICROGRAM(S): at 06:29

## 2021-10-18 RX ADMIN — Medication 975 MILLIGRAM(S): at 00:33

## 2021-10-18 RX ADMIN — Medication 975 MILLIGRAM(S): at 19:08

## 2021-10-18 RX ADMIN — Medication 600 MILLIGRAM(S): at 22:10

## 2021-10-18 RX ADMIN — Medication 600 MILLIGRAM(S): at 04:00

## 2021-10-18 RX ADMIN — Medication 600 MILLIGRAM(S): at 15:00

## 2021-10-18 RX ADMIN — SIMETHICONE 80 MILLIGRAM(S): 80 TABLET, CHEWABLE ORAL at 13:04

## 2021-10-18 RX ADMIN — Medication 600 MILLIGRAM(S): at 21:17

## 2021-10-18 RX ADMIN — SIMETHICONE 80 MILLIGRAM(S): 80 TABLET, CHEWABLE ORAL at 21:17

## 2021-10-18 NOTE — CHART NOTE - NSCHARTNOTEFT_GEN_A_CORE
Patient seen for: nutrition consult for GDM postpartum education prior to discharge    Source: patient, EMR    Patient is a 34 yo female  now POD#2 s/p LTCS  Admission date: 10/16  Antepartum course significant for GDMA1. Pt reports hx of GDM with 1 prior pregnancy.   Pt plans on breastfeeding infant (pumping)    Chart reviewed, events noted. Meds/labs reviewed.    Anthropometrics:  Height: 67 inches  Pre-pregnancy weight: 165 pounds   Weight gain: 27.9 pounds   Admit/Dosing wt: 192.9 pounds     Nutrition Diagnosis:   Food and Nutrition Related Knowledge Deficit related to knowledge of post-partum GDM nutrition recommendations as evidenced by POD#2 s/p LTCS, antepartum course complicated by GDM.   Goal: Pt able to teach back 2 points of nutrition education provided.     Nutrition Intervention:  Post-partum GDM diet education provided to patient:   1) Increased risk of developing T2DM with GDM and ways to help prevent development  2) 4-12 week fasting oral glucose tolerance test follow-up as outpatient  3) General healthful diet and physical activity recommendations discussed  4) Increased energy needs with breastfeeding    After-delivery GDM education handout provided. All nutrition-related questions answered. Pt made aware RD remains available.     Monitoring:  No other nutrition risks were identified during this encounter.  RD remains available upon request and will follow up per protocol.    Dot Garcia MS, RD, CDN [facility] Pager #[phone]

## 2021-10-18 NOTE — PROGRESS NOTE ADULT - ATTENDING COMMENTS
Agree with note as above: s/p rCS, uncomplicated surgery, baby cont in NICU. mother meeting post op milestones and if infant discharged from NICU would like to go home today, otherwise will plan for dc tomorrow.     - circ for infant pending NICU clearance

## 2021-10-19 VITALS
HEART RATE: 65 BPM | TEMPERATURE: 99 F | OXYGEN SATURATION: 99 % | RESPIRATION RATE: 18 BRPM | DIASTOLIC BLOOD PRESSURE: 70 MMHG | SYSTOLIC BLOOD PRESSURE: 109 MMHG

## 2021-10-19 PROCEDURE — 86780 TREPONEMA PALLIDUM: CPT

## 2021-10-19 PROCEDURE — G0463: CPT

## 2021-10-19 PROCEDURE — 86901 BLOOD TYPING SEROLOGIC RH(D): CPT

## 2021-10-19 PROCEDURE — 85460 HEMOGLOBIN FETAL: CPT

## 2021-10-19 PROCEDURE — 86900 BLOOD TYPING SEROLOGIC ABO: CPT

## 2021-10-19 PROCEDURE — 82962 GLUCOSE BLOOD TEST: CPT

## 2021-10-19 PROCEDURE — 87635 SARS-COV-2 COVID-19 AMP PRB: CPT

## 2021-10-19 PROCEDURE — 85025 COMPLETE CBC W/AUTO DIFF WBC: CPT

## 2021-10-19 PROCEDURE — 88302 TISSUE EXAM BY PATHOLOGIST: CPT

## 2021-10-19 PROCEDURE — 59025 FETAL NON-STRESS TEST: CPT

## 2021-10-19 PROCEDURE — 88307 TISSUE EXAM BY PATHOLOGIST: CPT

## 2021-10-19 PROCEDURE — 86769 SARS-COV-2 COVID-19 ANTIBODY: CPT

## 2021-10-19 PROCEDURE — 86850 RBC ANTIBODY SCREEN: CPT

## 2021-10-19 PROCEDURE — 59050 FETAL MONITOR W/REPORT: CPT

## 2021-10-19 RX ORDER — OXYCODONE HYDROCHLORIDE 5 MG/1
1 TABLET ORAL
Qty: 0 | Refills: 0 | DISCHARGE
Start: 2021-10-19

## 2021-10-19 RX ORDER — LEVOTHYROXINE SODIUM 125 MCG
1 TABLET ORAL
Qty: 0 | Refills: 0 | DISCHARGE

## 2021-10-19 RX ORDER — IBUPROFEN 200 MG
1 TABLET ORAL
Qty: 0 | Refills: 0 | DISCHARGE
Start: 2021-10-19

## 2021-10-19 RX ORDER — ACETAMINOPHEN 500 MG
3 TABLET ORAL
Qty: 0 | Refills: 0 | DISCHARGE
Start: 2021-10-19

## 2021-10-19 RX ADMIN — Medication 600 MILLIGRAM(S): at 04:19

## 2021-10-19 RX ADMIN — Medication 600 MILLIGRAM(S): at 09:30

## 2021-10-19 RX ADMIN — Medication 975 MILLIGRAM(S): at 12:55

## 2021-10-19 RX ADMIN — Medication 975 MILLIGRAM(S): at 02:01

## 2021-10-19 RX ADMIN — SIMETHICONE 80 MILLIGRAM(S): 80 TABLET, CHEWABLE ORAL at 03:54

## 2021-10-19 RX ADMIN — Medication 975 MILLIGRAM(S): at 01:12

## 2021-10-19 RX ADMIN — Medication 975 MILLIGRAM(S): at 13:25

## 2021-10-19 RX ADMIN — SIMETHICONE 80 MILLIGRAM(S): 80 TABLET, CHEWABLE ORAL at 12:54

## 2021-10-19 RX ADMIN — Medication 600 MILLIGRAM(S): at 10:00

## 2021-10-19 RX ADMIN — HEPARIN SODIUM 5000 UNIT(S): 5000 INJECTION INTRAVENOUS; SUBCUTANEOUS at 06:37

## 2021-10-19 RX ADMIN — Medication 975 MILLIGRAM(S): at 06:36

## 2021-10-19 RX ADMIN — Medication 75 MICROGRAM(S): at 06:37

## 2021-10-19 RX ADMIN — Medication 600 MILLIGRAM(S): at 03:53

## 2021-10-19 NOTE — PROGRESS NOTE ADULT - SUBJECTIVE AND OBJECTIVE BOX
Day 1 of Anesthesia Pain Management Service    SUBJECTIVE:  Pain Scale Score:          [X] Refer to charted pain scores    THERAPY:    s/p neuraxial PF morphine    MEDICATIONS  (STANDING):  acetaminophen   Tablet .. 975 milliGRAM(s) Oral <User Schedule>  diphtheria/tetanus/pertussis (acellular) Vaccine (ADAcel) 0.5 milliLiter(s) IntraMuscular once  heparin   Injectable 5000 Unit(s) SubCutaneous every 12 hours  ibuprofen  Tablet. 600 milliGRAM(s) Oral every 6 hours  ketorolac   Injectable 30 milliGRAM(s) IV Push every 6 hours  lactated ringers. 1000 milliLiter(s) (125 mL/Hr) IV Continuous <Continuous>  levothyroxine 75 MICROGram(s) Oral daily  morphine PF Spinal 0.1 milliGRAM(s) IntraThecal. once  oxytocin Infusion 333.333 milliUNIT(s)/Min (1000 mL/Hr) IV Continuous <Continuous>  oxytocin Infusion 333.333 milliUNIT(s)/Min (1000 mL/Hr) IV Continuous <Continuous>    MEDICATIONS  (PRN):  dexAMETHasone  Injectable 4 milliGRAM(s) IV Push every 6 hours PRN Nausea  diphenhydrAMINE 25 milliGRAM(s) Oral every 6 hours PRN Pruritus  lanolin Ointment 1 Application(s) Topical every 6 hours PRN Sore Nipples  magnesium hydroxide Suspension 30 milliLiter(s) Oral two times a day PRN Constipation  nalbuphine Injectable 2.5 milliGRAM(s) IV Push every 6 hours PRN Pruritus  naloxone Injectable 0.1 milliGRAM(s) IV Push every 3 minutes PRN For ANY of the following changes in patient status:  A. Breaths Per Minute LESS THAN 10, B. Oxygen saturation LESS THAN 90%, C. Sedation score of 6 for Stop After: 4 Times  ondansetron Injectable 4 milliGRAM(s) IV Push every 6 hours PRN Nausea  oxyCODONE    IR 5 milliGRAM(s) Oral every 3 hours PRN Mild Pain (1 - 3)  oxyCODONE    IR 10 milliGRAM(s) Oral every 3 hours PRN Moderate Pain (4 - 6)  oxyCODONE    IR 5 milliGRAM(s) Oral every 3 hours PRN Moderate to Severe Pain (4-10)  oxyCODONE    IR 5 milliGRAM(s) Oral once PRN Moderate to Severe Pain (4-10)  simethicone 80 milliGRAM(s) Chew every 4 hours PRN Gas      OBJECTIVE:    Sedation:        	[X] Alert	[ ] Drowsy	[ ] Arousable      [ ] Asleep       [ ] Unresponsive    Side Effects:	[X] None	[ ] Nausea	[ ] Vomiting         [ ] Pruritus  		[ ] Weakness            [ ] Numbness	          [ ] Other:    Vital Signs Last 24 Hrs  T(C): 37 (17 Oct 2021 09:15), Max: 37 (17 Oct 2021 09:15)  T(F): 98.6 (17 Oct 2021 09:15), Max: 98.6 (17 Oct 2021 09:15)  HR: 55 (17 Oct 2021 09:15) (55 - 88)  BP: 94/57 (17 Oct 2021 09:15) (94/57 - 127/57)  BP(mean): 77 (17 Oct 2021 02:22) (74 - 86)  RR: 18 (17 Oct 2021 09:15) (15 - 23)  SpO2: 96% (17 Oct 2021 09:15) (95% - 100%)    ASSESSMENT/ PLAN  [X] Patient transitioned to prn analgesics  [X] Pain management per primary service, pain service to sign off   [X]Documentation and Verification of current medications
OB Postpartum Note:  Delivery, POD#3    S: 34yo POD#3 s/p LTCS. The patient feels well.  Pain is well controlled. She is tolerating a regular diet and passing flatus. She is voiding spontaneously, and ambulating without difficulty. Denies CP/SOB. Denies lightheadedness, dizziness, or  N/V.    O:  Vitals:  Vital Signs Last 24 Hrs  T(C): 37.1 (19 Oct 2021 06:28), Max: 37.4 (18 Oct 2021 17:40)  T(F): 98.7 (19 Oct 2021 06:28), Max: 99.4 (18 Oct 2021 17:40)  HR: 65 (19 Oct 2021 06:28) (65 - 74)  BP: 109/70 (19 Oct 2021 06:28) (107/57 - 112/68)  BP(mean): --  RR: 18 (19 Oct 2021 06:28) (18 - 18)  SpO2: 99% (19 Oct 2021 06:28) (99% - 100%)    MEDICATIONS  (STANDING):  acetaminophen   Tablet .. 975 milliGRAM(s) Oral <User Schedule>  diphtheria/tetanus/pertussis (acellular) Vaccine (ADAcel) 0.5 milliLiter(s) IntraMuscular once  heparin   Injectable 5000 Unit(s) SubCutaneous every 12 hours  ibuprofen  Tablet. 600 milliGRAM(s) Oral every 6 hours  lactated ringers. 1000 milliLiter(s) (125 mL/Hr) IV Continuous <Continuous>  levothyroxine 75 MICROGram(s) Oral daily  oxytocin Infusion 333.333 milliUNIT(s)/Min (1000 mL/Hr) IV Continuous <Continuous>  oxytocin Infusion 333.333 milliUNIT(s)/Min (1000 mL/Hr) IV Continuous <Continuous>    MEDICATIONS  (PRN):  dexAMETHasone  Injectable 4 milliGRAM(s) IV Push every 6 hours PRN Nausea  diphenhydrAMINE 25 milliGRAM(s) Oral every 6 hours PRN Pruritus  lanolin Ointment 1 Application(s) Topical every 6 hours PRN Sore Nipples  magnesium hydroxide Suspension 30 milliLiter(s) Oral two times a day PRN Constipation  nalbuphine Injectable 2.5 milliGRAM(s) IV Push every 6 hours PRN Pruritus  naloxone Injectable 0.1 milliGRAM(s) IV Push every 3 minutes PRN For ANY of the following changes in patient status:  A. Breaths Per Minute LESS THAN 10, B. Oxygen saturation LESS THAN 90%, C. Sedation score of 6 for Stop After: 4 Times  ondansetron Injectable 4 milliGRAM(s) IV Push every 6 hours PRN Nausea  oxyCODONE    IR 5 milliGRAM(s) Oral every 3 hours PRN Moderate to Severe Pain (4-10)  oxyCODONE    IR 5 milliGRAM(s) Oral once PRN Moderate to Severe Pain (4-10)  simethicone 80 milliGRAM(s) Chew every 4 hours PRN Gas      LABS:  Blood type: O Positive  Rubella IgG: RPR: Negative                          10.3<L>   13.18<H> >-----------< 241    ( 10-17 @ 07:42 )             31.7<L>                        10.3<L>   10.54<H> >-----------< 248    ( 10-16 @ 19:47 )             32.9<L>      Physical exam:  Gen: NAD  CV: RRR  Pulm: CTAB  Abdomen: Soft, nontender, no distension , firm uterine fundus above umbilicus.  Incision: Clean, dry, and intact   Pelvic: Normal lochia noted  Ext: No calf tenderness or edema          
Service Attending Note:     TAUS notable for JYOTI thin at 3mm.   SVE now at fingertip/ 50%.   Decision per 1' provider to proceed with a  delivery given painful contractions and relative cervical change.   Will alert unit for plan of care.   Patient last ate at 10:30 am.     ELHAM Leyva MD 
OB Attending Note      S: Pt feeling well, +F, pain controlled    Physical exam:    Vital Signs Last 24 Hrs  T(C): 37 (17 Oct 2021 09:15), Max: 37 (17 Oct 2021 09:15)  T(F): 98.6 (17 Oct 2021 09:15), Max: 98.6 (17 Oct 2021 09:15)  HR: 55 (17 Oct 2021 09:15) (55 - 88)  BP: 94/57 (17 Oct 2021 09:15) (94/57 - 127/57)  BP(mean): 77 (17 Oct 2021 02:22) (74 - 86)  RR: 18 (17 Oct 2021 09:15) (15 - 23)  SpO2: 96% (17 Oct 2021 09:15) (95% - 100%)  I&O's Summary    16 Oct 2021 07:01  -  17 Oct 2021 07:00  --------------------------------------------------------  IN: 1000 mL / OUT: 1900 mL / NET: -900 mL        Gen: NAD  Breast: Soft, nontender, not engorged.  Abdomen: Soft, nontender, no distension , firm uterine fundus at umbilicus.  Incision: Clean, dry, and intact with perinotape  Scant Lochia  Ext: No calf tenderness    LABS:                        10.3   13.18 )-----------( 241      ( 17 Oct 2021 07:42 )             31.7                         10.3   10.54 )-----------( 248      ( 16 Oct 2021 19:47 )             32.9     ABO Interpretation: O (10-16 @ 19:57)  Rh Interpretation: Positive (10-16 @ 19:57)  Antibody Screen: Negative (10-16 @ 19:57)    10-14- @ 12:33      138  |  103  |  5<L>  ----------------------------<  66<L>  3.5   |  18<L>  |  0.51        Ca    8.4      14 Oct 2021 12:33                Assessment and Plan  POD #1  s/p  section  Doing well.  Continue Ambulation/OOB/Venodynes/heparin  Cont Pain medications  Regular diet  Reviewed section and scar tissue - no complications during delivery,  came out with spontaneous cry and good tone        
OB Attending Note      S: Pt feeling well, +Flatus and + BM, pain controlled. Ambulating, voiding and tolerating PO    Physical exam:    Vital Signs Last 24 Hrs  T(C): 37.1 (19 Oct 2021 06:28), Max: 37.4 (18 Oct 2021 17:40)  T(F): 98.7 (19 Oct 2021 06:28), Max: 99.4 (18 Oct 2021 17:40)  HR: 65 (19 Oct 2021 06:28) (65 - 74)  BP: 109/70 (19 Oct 2021 06:28) (109/70 - 112/68)  BP(mean): --  RR: 18 (19 Oct 2021 06:28) (18 - 18)  SpO2: 99% (19 Oct 2021 06:28) (99% - 99%)  I&O's Summary      Gen: NAD  Abdomen: Soft, nontender, no distension , firm uterine fundus at umbilicus.  Incision: Clean, dry, and intact  Scant Lochia  Ext: No calf tenderness    LABS:                        Assessment and Plan  POD #3 s/p  section  Doing well.  Continue Ambulation/OOB/Venodynes/heparin  Cont Pain medications  Regular diet  Desires  circumcision. Risks reviewed.   Discharge home. discharge instructions reviewed. FU in office in 2 weeks    Laurence Cruz DO        
Service Attending Note:     TAUS notable for JYOTI thin at 3mm.   SVE now at fingertip/ 50%.   Decision per 1' provider to proceed with a  delivery given painful contractions and relative cervical change.   Will alert unit for plan of care.   Patient last ate at 10:30 am.     ELHAM Leyva MD 
OB Progress Note:  Delivery, POD#1    S: 36yo POD#1 s/p LTCS. Pain well controlled, tolerating regular diet, not yet passing flatus, ambulating without difficulty, voiding spontaneously. Denies heavy vaginal bleeding, N/V, CP/SOB/lightheadedness/dizziness, headache, visual disturbances, epigastric pain.     O:   Vital Signs Last 24 Hrs  T(C): 36.7 (17 Oct 2021 03:13), Max: 36.8 (16 Oct 2021 16:59)  T(F): 98.1 (17 Oct 2021 03:13), Max: 98.2 (16 Oct 2021 16:59)  HR: 63 (17 Oct 2021 03:13) (56 - 88)  BP: 104/58 (17 Oct 2021 03:13) (104/58 - 127/57)  BP(mean): 77 (17 Oct 2021 02:22) (74 - 86)  RR: 16 (17 Oct 2021 03:13) (16 - 23)  SpO2: 98% (17 Oct 2021 03:13) (96% - 100%)    Labs:  Blood type: O Positive  Rubella IgG: RPR: Negative                          10.3<L>   10.54<H> >-----------< 248    ( 10-16 @ 19:47 )             32.9<L>                        10.1<L>   9.30 >-----------< 246    ( 10-14 @ 12:33 )             31.7<L>    10-14-21 @ 12:33      138  |  103  |  5<L>  ----------------------------<  66<L>  3.5   |  18<L>  |  0.51        Ca    8.4      14 Oct 2021 12:33            PE:  General: NAD  Abdomen: Mildly distended, appropriately tender, Fundus firm, incision c/d/i.  VE: No heavy vaginal bleeding  Extremities: No erythema, no pitting edema    
OB Progress Note: LTCS, POD#2    S: 36yo POD#2 s/p LTCS. Pain is well controlled. She is tolerating a regular diet and passing flatus. She is voiding spontaneously, and ambulating without difficulty. Denies CP/SOB. Denies lightheadedness, dizziness, or N/V.    O:  Vitals:  Vital Signs Last 24 Hrs  T(C): 37 (18 Oct 2021 05:36), Max: 37 (17 Oct 2021 09:15)  T(F): 98.6 (18 Oct 2021 05:36), Max: 98.6 (17 Oct 2021 09:15)  HR: 72 (18 Oct 2021 05:36) (55 - 72)  BP: 105/62 (18 Oct 2021 05:36) (94/57 - 106/65)  BP(mean): --  RR: 18 (18 Oct 2021 05:36) (18 - 18)  SpO2: 98% (18 Oct 2021 05:36) (96% - 100%)    MEDICATIONS  (STANDING):  acetaminophen   Tablet .. 975 milliGRAM(s) Oral <User Schedule>  diphtheria/tetanus/pertussis (acellular) Vaccine (ADAcel) 0.5 milliLiter(s) IntraMuscular once  heparin   Injectable 5000 Unit(s) SubCutaneous every 12 hours  ibuprofen  Tablet. 600 milliGRAM(s) Oral every 6 hours  lactated ringers. 1000 milliLiter(s) (125 mL/Hr) IV Continuous <Continuous>  levothyroxine 75 MICROGram(s) Oral daily  oxytocin Infusion 333.333 milliUNIT(s)/Min (1000 mL/Hr) IV Continuous <Continuous>  oxytocin Infusion 333.333 milliUNIT(s)/Min (1000 mL/Hr) IV Continuous <Continuous>      MEDICATIONS  (PRN):  dexAMETHasone  Injectable 4 milliGRAM(s) IV Push every 6 hours PRN Nausea  diphenhydrAMINE 25 milliGRAM(s) Oral every 6 hours PRN Pruritus  lanolin Ointment 1 Application(s) Topical every 6 hours PRN Sore Nipples  magnesium hydroxide Suspension 30 milliLiter(s) Oral two times a day PRN Constipation  nalbuphine Injectable 2.5 milliGRAM(s) IV Push every 6 hours PRN Pruritus  naloxone Injectable 0.1 milliGRAM(s) IV Push every 3 minutes PRN For ANY of the following changes in patient status:  A. Breaths Per Minute LESS THAN 10, B. Oxygen saturation LESS THAN 90%, C. Sedation score of 6 for Stop After: 4 Times  ondansetron Injectable 4 milliGRAM(s) IV Push every 6 hours PRN Nausea  oxyCODONE    IR 5 milliGRAM(s) Oral every 3 hours PRN Moderate to Severe Pain (4-10)  oxyCODONE    IR 5 milliGRAM(s) Oral once PRN Moderate to Severe Pain (4-10)  simethicone 80 milliGRAM(s) Chew every 4 hours PRN Gas      Labs:  Blood type: O Positive  Rubella IgG: RPR: Negative                          10.3<L>   13.18<H> >-----------< 241    ( 10-17 @ 07:42 )             31.7<L>                        10.3<L>   10.54<H> >-----------< 248    ( 10-16 @ 19:47 )             32.9<L>      PE:  General: NAD  CV: RRR  Pulm: CTAB  Abdomen: Soft, appropriately tender, incision c/d/i. Fundus firm above umbilicus  Extremities: No calf tenderness, no pitting edema

## 2021-10-19 NOTE — PROGRESS NOTE ADULT - ASSESSMENT
A/P: 36yo  pregnancy c/b GDMA1 POD#2 s/p LTCS.  Patient is stable and doing well post-operatively.    - Continue regular diet.  - Increase ambulation, encourage SCDs when not ambulating, Heparin for DVT ppx  - Continue motrin, tylenol, oxycodone PRN for pain control    ALEXIS Norris PGY1
A/P: 36yo POD#1 s/p LTCS.  Patient is stable and doing well post-operatively.    - Continue regular diet  - Increase ambulation  - Continue motrin, tylenol, oxycodone PRN for pain control.    - f/u AM CBC    Freida Traore, PGY1
A/P: 34yo  pregnancy c/b GDMA1 POD#3 s/p rLTCS.  Patient is stable and is doing well post-operatively.    - Continue motrin, tylenol, oxycodone PRN for pain control.  - Increase ambulation, encourage SCDs when not ambulating, Heparin for DVT ppx  - Continue regular diet  - Discharge planning    ALEXIS Norris PGY1

## 2021-11-01 LAB — SURGICAL PATHOLOGY STUDY: SIGNIFICANT CHANGE UP

## 2022-01-06 NOTE — OB RN INTRAOPERATIVE NOTE - NS_ORROOM _OBGYN_ALL_OB
Final no contact letter sent to patient for follow up with Dr. Blum.  Message forwarded to PCP as an FYI.   
"A"

## 2022-02-17 NOTE — PATIENT PROFILE OB - FALL HARM RISK TYPE OF ASSESSMENT
----- Message from Chanel Grande MD sent at 2/17/2022  1:59 PM CST -----  Please inform patient echocardiogram - normal  Normal wall thickness  Normal ejection fraction (how heart is pumping) EF 60%     Admission

## 2022-04-03 NOTE — PATIENT PROFILE OB - EDD BY ULTRASOUND, OB PROFILE
nutrition-focused physical examination deferred -pt confused, appears in pain at this time, requesting interview to be brief. No overt signs of fat/muscle loss visually noted. 22-Jun-2018

## 2022-05-10 NOTE — OB RN PATIENT PROFILE - NS_ADMITROM_OBGYN_ALL_OB
Patient discharged to home via wheelchair in stable condition with otherBrother. Belongings sent home with patient. Patient verbalized understanding of discharge instructions for self. IPOCs and education completed.   No

## 2023-11-03 NOTE — DISCHARGE NOTE OB - NSDCCPGOAL_GEN_ALL_CORE_FT
Detail Level: Detailed
Products Recommended: Sunblock recommendation sheet available to patient
General Sunscreen Counseling: I recommended a zinc or titanium- based sunscreen with a SPF of 30 or higher. I explained that SPF 30 sunscreens block approximately 97 percent of the sun's harmful rays. Sunscreens should be applied at least 15 minutes prior to expected sun exposure and then every 2 hours after that as long as sun exposure continues. If swimming or exercising sunscreen should be reapplied every 45 minutes to an hour after getting wet or sweating. One ounce, or the equivalent of a shot glass full of sunscreen, is adequate to protect the skin not covered by a bathing suit. I also recommended a lip balm with a sunscreen as well. Sun protective clothing can be used in lieu of sunscreen but must be worn the entire time you are exposed to the sun's rays.
pain control and ambulation